# Patient Record
Sex: FEMALE | Race: WHITE | Employment: STUDENT | URBAN - METROPOLITAN AREA
[De-identification: names, ages, dates, MRNs, and addresses within clinical notes are randomized per-mention and may not be internally consistent; named-entity substitution may affect disease eponyms.]

---

## 2018-10-23 ENCOUNTER — TELEPHONE (OUTPATIENT)
Dept: DERMATOLOGY | Facility: CLINIC | Age: 18
End: 2018-10-23

## 2018-10-23 NOTE — TELEPHONE ENCOUNTER
Dermatology Pre-visit Call:    Reason for visit : Acne     Left voicemail regarding appointment on 10/26/18 at 1030. Call back number provided for questions or rescheduling.

## 2018-10-26 ENCOUNTER — OFFICE VISIT (OUTPATIENT)
Dept: DERMATOLOGY | Facility: CLINIC | Age: 18
End: 2018-10-26
Payer: COMMERCIAL

## 2018-10-26 DIAGNOSIS — L70.0 ACNE VULGARIS: Primary | ICD-10-CM

## 2018-10-26 RX ORDER — NORGESTIMATE AND ETHINYL ESTRADIOL 7DAYSX3 LO
1 KIT ORAL DAILY
Qty: 28 TABLET | Refills: 3 | Status: SHIPPED | OUTPATIENT
Start: 2018-10-26 | End: 2018-12-17

## 2018-10-26 RX ORDER — ADAPALENE 45 G/G
GEL TOPICAL AT BEDTIME
Qty: 15 G | Refills: 3 | Status: SHIPPED | OUTPATIENT
Start: 2018-10-26 | End: 2019-01-23

## 2018-10-26 RX ORDER — CLINDAMYCIN PHOSPHATE 10 UG/ML
LOTION TOPICAL EVERY MORNING
Qty: 60 ML | Refills: 3 | Status: SHIPPED | OUTPATIENT
Start: 2018-10-26 | End: 2019-01-23

## 2018-10-26 ASSESSMENT — PAIN SCALES - GENERAL: PAINLEVEL: NO PAIN (0)

## 2018-10-26 NOTE — MR AVS SNAPSHOT
After Visit Summary   10/26/2018    Sabra Mtz    MRN: [id number]           Patient Information     Date Of Birth          2000        Visit Information        Provider Department      10/26/2018 10:30 AM Jung Naidu MD [facility] Dermatology        Today's Diagnoses     Acne vulgaris    -  1       Follow-ups after your visit        Your next 10 appointments already scheduled     2018 12:00 PM CST   (Arrive by 11:45 AM)   Return Visit with JACQUELINE Lucero [facility] Dermatology ([facility])    [address] [place] [id number] [phone]              Who to contact     Please call your clinic at [phone] to:    Ask questions about your health    Make or cancel appointments    Discuss your medicines    Learn about your test results    Speak to your doctor            Additional Information About Your Visit        MyChart Information     Cubikalt is an electronic gateway that provides easy, online access to your medical records. With Muut, you can request a clinic appointment, read your test results, renew a prescription or communicate with your care team.     To sign up for Cubikalt visit the website at [email or web address]   You will be asked to enter the access code listed below, as well as some personal information. Please follow the directions to create your username and password.     Your access code is: [email or web address]  Expires: 2019  6:30 AM     Your access code will  in 90 days. If you need help or a new code, please contact your [facility] or call [phone] for assistance.        Care EveryWhere ID     This is your Care EveryWhere ID. This could be used by other organizations to access your [facility] medical records  [id number]         Blood Pressure from Last 3 Encounters:   16 105/56    Weight from Last 3 Encounters:   16 52.2 kg (115 lb) (28  %)*     * Growth percentiles are based on [facility] 2-20 Years data.              Today, you had the following     No orders found for display         Today's Medication Changes          These changes are accurate as of 10/26/18 11:43 AM.  If you have any questions, ask your nurse or doctor.               Start taking these medicines.        Dose/Directions    adapalene 0.1 % gel   Commonly known as:  DIFFERIN   Used for:  Acne vulgaris   Started by:  Jung Naidu MD        Apply topically At Bedtime   Quantity:  15 g   Refills:  3       benzoyl peroxide 5 % Liqd   Used for:  Acne vulgaris   Started by:  Jung Naidu MD        Use daily as directed   Quantity:  226 g   Refills:  3       clindamycin 1 % lotion   Commonly known as:  CLEOCIN T   Used for:  Acne vulgaris   Started by:  Jung Naidu MD        Apply topically every morning   Quantity:  60 mL   Refills:  3       norgestim-eth estrad triphasic 0.18/0.215/0.25 MG-25 MCG per tablet   Commonly known as:  ORTHO TRI-CYCLEN LO   Used for:  Acne vulgaris   Started by:  Jung Naidu MD        Dose:  1 tablet   Take 1 tablet by mouth daily   Quantity:  28 tablet   Refills:  3            Where to get your medicines      These medications were sent to [address] [address]    Hours:  TRANSPLANT PHONE NUMBER [phone] Phone:  [phone]     adapalene 0.1 % gel    benzoyl peroxide 5 % Liqd    clindamycin 1 % lotion    norgestim-eth estrad triphasic 0.18/0.215/0.25 MG-25 MCG per tablet                Primary Care Provider Fax #    Physician No Ref-Primary [phone]       No address on file        Equal Access to Services     SILVINO LANDRUM : Ella Betancourt, wazack licona, qaybta kaaljacqueline smalls, kevin caceres. So [facility] [phone].    ATENCIÓN: Si habla español, tiene a younger disposición  servicios gratuitos de asistencia lingüística. Violet mancera [phone].    We comply with applicable federal civil rights laws and [place] laws. We do not discriminate on the basis of race, color, national origin, age, disability, sex, sexual orientation, or gender identity.            Thank you!     Thank you for choosing [facility] DERMATOLOGY  for your care. Our goal is always to provide you with excellent care. Hearing back from our patients is one way we can continue to improve our services. Please take a few minutes to complete the written survey that you may receive in the mail after your visit with us. Thank you!             Your Updated Medication List - Protect others around you: Learn how to safely use, store and throw away your medicines at www.disposemymeds.org.          This list is accurate as of 10/26/18 11:43 AM.  Always use your most recent med list.                   Brand Name Dispense Instructions for use Diagnosis    adapalene 0.1 % gel    DIFFERIN    15 g    Apply topically At Bedtime    Acne vulgaris       benzoyl peroxide 5 % Liqd     226 g    Use daily as directed    Acne vulgaris       clindamycin 1 % lotion    CLEOCIN T    60 mL    Apply topically every morning    Acne vulgaris       norgestim-eth estrad triphasic 0.18/0.215/0.25 MG-25 MCG per tablet    ORTHO TRI-CYCLEN LO    28 tablet    Take 1 tablet by mouth daily    Acne vulgaris

## 2018-10-26 NOTE — PROGRESS NOTES
[facility] Dermatology Note      Dermatology Problem List:  1. Acne vulgaris    Start PO birthcontrol today    Start clindamycin lotion qAM    Start differin qPM    3wk appointment in [facility] to start accutane    Encounter Date: Oct 26, 2018    CC:   Chief Complaint   Patient presents with     Acne     Sabra is here today to talk about going on acne. Today is a great for her acne. She states that her chest and back are the main areas.          History of Present Illness:  Ms. Sabra Mtz is a 18 year old female who presents for evaluation of acne. The patient states the she has been struggling with acne for the last 7 years. Her face, neck, upper chest and upper back are most affected. She has tried a variety of topicals, oral doxycycline and oral birth control - none of which have made a substantial difference. Her younger sister just completed 6 months of accutane with remarkable results and the patient would like to try it. Her acne fluctuate a lot and are worse in the summer. 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 She denies tobacco, etoh.     Past Medical History:   There is no problem list on file for this patient.    History reviewed. No pertinent past medical history.  History reviewed. No pertinent surgical history.    Social History:  Patient  reports that she has never smoked. She has never used smokeless tobacco.    Family History:  History reviewed. No pertinent family history.    Medications:  No current outpatient prescriptions on file.        No Known Allergies      Review of Systems:  -As per HPI  -Constitutional: The patient denies fatigue, fevers, chills, unintended weight loss, and night sweats.  -HEENT: Patient denies nonhealing oral sores.  -Skin: As above in HPI. No additional skin concerns.    Physical exam:  Vitals: There were no vitals taken for this visit.  GEN: This is a well developed, well-nourished female in no acute distress, in a  pleasant mood.    SKIN: Acne exam, which includes the face, neck, upper central chest, and upper central back was performed.  -Face and neck - relatively clear without comedones or scarring  -Upper chest is relatively clear without comedones or scarring  -There are superifical acneiform papules with intermixed open and closed comedones on the back.   -No other lesions of concern on areas examined.     Impression/Plan:  2. Acne vulgaris    Start PO birthcontrol today    Start clindamycin lotion qAM    Start differin qPM    3wk appointment in [facility] clinic to start accutane (I would propose 30mg daily for about 6months)    CC Dr. Naidu on close of this encounter.  Follow-up in 3 weeks with [place] for pledge, earlier for new or changing lesions        Staff Physician Comments:  I saw and evaluated the patient with the resident and I agree with the assessment and plan as documented in the resident's note.    Jung Naidu MD  Professor  Head of Dermato-Allergy Division  Department of Dermatology  [facility]  .    Staff Involved:  Resident(Leroy Garcia)/Staff(as above)

## 2018-10-26 NOTE — LETTER
Date:October 29, 2018      Patient was self referred, no letter generated. Do not send.        [facility] Health Information

## 2018-10-26 NOTE — NURSING NOTE
Chief Complaint   Patient presents with     Acne     Sabra is here today to talk about going on acne. Today is a great for her acne. She states that her chest and back are the main areas.      Juli Pederson, CMA

## 2018-11-16 ENCOUNTER — OFFICE VISIT (OUTPATIENT)
Dept: DERMATOLOGY | Facility: CLINIC | Age: 18
End: 2018-11-16
Payer: COMMERCIAL

## 2018-11-16 ENCOUNTER — TELEPHONE (OUTPATIENT)
Dept: DERMATOLOGY | Facility: CLINIC | Age: 18
End: 2018-11-16

## 2018-11-16 DIAGNOSIS — L70.0 ACNE VULGARIS: ICD-10-CM

## 2018-11-16 DIAGNOSIS — L70.0 ACNE VULGARIS: Primary | ICD-10-CM

## 2018-11-16 LAB
ALBUMIN SERPL-MCNC: 4 G/DL (ref 3.4–5)
ALP SERPL-CCNC: 54 U/L (ref 40–150)
ALT SERPL W P-5'-P-CCNC: 26 U/L (ref 0–50)
ANION GAP SERPL CALCULATED.3IONS-SCNC: 4 MMOL/L (ref 3–14)
AST SERPL W P-5'-P-CCNC: 23 U/L (ref 0–45)
BASOPHILS # BLD AUTO: 0 10E9/L (ref 0–0.2)
BASOPHILS NFR BLD AUTO: 0.5 %
BILIRUB SERPL-MCNC: 0.4 MG/DL (ref 0.2–1.3)
BUN SERPL-MCNC: 11 MG/DL (ref 7–30)
CALCIUM SERPL-MCNC: 8.7 MG/DL (ref 8.5–10.1)
CHLORIDE SERPL-SCNC: 104 MMOL/L (ref 94–109)
CHOLEST SERPL-MCNC: 168 MG/DL
CO2 SERPL-SCNC: 28 MMOL/L (ref 20–32)
CREAT SERPL-MCNC: 0.88 MG/DL (ref 0.52–1.04)
DIFFERENTIAL METHOD BLD: ABNORMAL
EOSINOPHIL # BLD AUTO: 0 10E9/L (ref 0–0.7)
EOSINOPHIL NFR BLD AUTO: 0.7 %
ERYTHROCYTE [DISTWIDTH] IN BLOOD BY AUTOMATED COUNT: 13.2 % (ref 10–15)
GFR SERPL CREATININE-BSD FRML MDRD: 82 ML/MIN/1.7M2
GLUCOSE SERPL-MCNC: 83 MG/DL (ref 70–99)
HCG UR QL: NEGATIVE
HCT VFR BLD AUTO: 41.2 % (ref 35–47)
HDLC SERPL-MCNC: 48 MG/DL
HGB BLD-MCNC: 12.9 G/DL (ref 11.7–15.7)
IMM GRANULOCYTES # BLD: 0 10E9/L (ref 0–0.4)
IMM GRANULOCYTES NFR BLD: 0.4 %
LDLC SERPL CALC-MCNC: 103 MG/DL
LYMPHOCYTES # BLD AUTO: 1.5 10E9/L (ref 0.8–5.3)
LYMPHOCYTES NFR BLD AUTO: 27.4 %
MCH RBC QN AUTO: 28 PG (ref 26.5–33)
MCHC RBC AUTO-ENTMCNC: 31.3 G/DL (ref 31.5–36.5)
MCV RBC AUTO: 89 FL (ref 78–100)
MONOCYTES # BLD AUTO: 0.3 10E9/L (ref 0–1.3)
MONOCYTES NFR BLD AUTO: 5.7 %
NEUTROPHILS # BLD AUTO: 3.7 10E9/L (ref 1.6–8.3)
NEUTROPHILS NFR BLD AUTO: 65.3 %
NONHDLC SERPL-MCNC: 120 MG/DL
NRBC # BLD AUTO: 0 10*3/UL
NRBC BLD AUTO-RTO: 0 /100
PLATELET # BLD AUTO: 278 10E9/L (ref 150–450)
POTASSIUM SERPL-SCNC: 4.1 MMOL/L (ref 3.4–5.3)
PROT SERPL-MCNC: 7.6 G/DL (ref 6.8–8.8)
RBC # BLD AUTO: 4.61 10E12/L (ref 3.8–5.2)
SODIUM SERPL-SCNC: 137 MMOL/L (ref 133–144)
TRIGL SERPL-MCNC: 85 MG/DL
WBC # BLD AUTO: 5.6 10E9/L (ref 4–11)

## 2018-11-16 ASSESSMENT — PAIN SCALES - GENERAL: PAINLEVEL: NO PAIN (0)

## 2018-11-16 NOTE — LETTER
11/16/2018       RE: Sabra Mtz  [address] [facility]  [address]  [place] [address]     Dear Colleague,    Thank you for referring your patient, Sabra Mtz, to the [facility] DERMATOLOGY at [facility]. Please see a copy of my visit note below.    [facility] Dermatology Note      Dermatology Problem List:  1. Acne vulgaris  - OCP, clindamycin 1% lotion, Differin 0.1% gel  - Initiate Accutane 11/16/18     Encounter Date: Nov 16, 2018    CC:   Chief Complaint   Patient presents with     Derm Problem     Acne follow up , Sabra would like to start accutane.      History of Present Illness:  Ms. Sabra Mtz is a 18 year old female who presents today for an acne follow up. The patient was last seen in the dermatology clinic on 10/26/18 by Dr. Naidu during which she started OCP birth control, clindamycin 1% lotion and Differin gel nightly for her acne vulgaris. Accutane was discussed at this visit.     Today she reports that there have been no changes in her acne on the oral birth control she started at her last visit. Her problematic acne is still on her back and on her chest. The acne on her back has been flaring often. She would still like to start Accutane at this visit. She has been using her topicals as prescribed.    Otherwise the patient reports no additional painful, bleeding, nonhealing or pruritic lesions and denies any new or changing moles.    Past Medical History:   There is no problem list on file for this patient.    No past medical history on file.  No past surgical history on file.    Social History:  Patient  reports that she has never smoked. She has never used smokeless tobacco.       Family History:  No family history on file.   Her sister finished a course of Accutane recently.     Medications:  Current Outpatient Prescriptions   Medication Sig Dispense Refill     adapalene (DIFFERIN) 0.1 % gel Apply topically At Bedtime 15 g  3     benzoyl peroxide 5 % LIQD Use daily as directed 226 g 3     clindamycin (CLEOCIN T) 1 % lotion Apply topically every morning 60 mL 3     norgestim-eth estrad triphasic (ORTHO TRI-CYCLEN LO) 0.18/0.215/0.25 MG-25 MCG per tablet Take 1 tablet by mouth daily 28 tablet 3        No Known Allergies      Review of Systems:  -Constitutional: The patient is in her usual state of health   -Skin: As above in HPI. No additional skin concerns.    Physical exam:  Vitals: There were no vitals taken for this visit.  GEN: This is a well developed, well-nourished female in no acute distress, in a pleasant mood.    SKIN: Acne exam, which includes the face, neck, upper central chest, and upper central back was performed.  - Scattered small pustules on central chest   - Few resolving inflammatory papules to her lateral face   -There are superifical acneiform papules with intermixed open and closed comedones on the back.   -No other lesions of concern on areas examined.     Impression/Plan:  1. Acne vulgaris- no improvement on topicals and OCP  -Discussion of the risks and side effects of Accutane including but not limited to mucocutaneous dryness, arthralgias, myalgias, depression, suicidal ideation, headache, blurred vision,  increase in liver function tests, increase in lipids, and teratogenic effects. Discussed need for two forms of contraception. The [employer] brochure was provided and the contents discussed with the patient. The patient was counseled they cannot give blood while on Accutane. No personal or family history of inflammatory bowel disease or hypertriglyceridemia known to patient. Reviewed need to avoid alcohol on medication.   Ipledge program consent was obtained. Patient counseled that if they wear contacts eye may become too dry to tolerate. Recommend follow up with eye doctor.   -At the next visit, we will begin Accutane 40 mg daily.  One month supply with no refills provided. Unable to obtain pt weight at  this time, will reassess at follow up in order to determine goal dose.   Baseline labs including CBC, CMP and fasting lipids  will be obtained.     Baseline pregnancy test today. The patients two forms of contraception are OCP and male latex condoms.   -She will have another pregnancy test in 1 month and then send her medication.   Total cumulative dose 0 mg (0 mg/kg).   Patient's I-pledge # is [phone].   The patient will stop all acne medications upon starting Accutane     Staff Involved:  Scribe/Staff    Scribe Disclosure:   I, Krysta Peters, am serving as a scribe to document services personally performed by Nikkie Ferrari PA-C, based on data collection and the provider's statements to me.    Provider Disclosure:   The documentation recorded by the scribe accurately reflects the services I personally performed and the decisions made by me.    All risks, benefits and alternatives were discussed with patient.  Patient is in agreement and understands the assessment and plan.  All questions were answered.  Sun Screen Education was given.   Return to Clinic in 2 months or sooner as needed.     Nikkie Ferrari PA-C

## 2018-11-16 NOTE — MR AVS SNAPSHOT
After Visit Summary   11/16/2018    Sabra Mtz    MRN: [id number]           Patient Information     Date Of Birth          2000        Visit Information        Provider Department      11/16/2018 12:00 PM Nikkie Ferrari PA-C [facility] Dermatology        Today's Diagnoses     Acne vulgaris    -  1       Follow-ups after your visit        Your next 10 appointments already scheduled     Nov 16, 2018  1:00 PM CST   LAB with [facility] ([facility])    [address] [place] [id number] [phone]           Please do not eat 10-12 hours before your appointment if you are coming in fasting for labs on lipids, cholesterol, or glucose (sugar). This does not apply to pregnant women. Water, hot tea and black coffee (with nothing added) are okay. Do not drink other fluids, diet soda or chew gum.            Dec 17, 2018  1:30 PM CST   LAB with  LAB   [facility] ([facility])    [address] [place] [id number] [phone]           Please do not eat 10-12 hours before your appointment if you are coming in fasting for labs on lipids, cholesterol, or glucose (sugar). This does not apply to pregnant women. Water, hot tea and black coffee (with nothing added) are okay. Do not drink other fluids, diet soda or chew gum.            Dec 17, 2018  2:00 PM CST   (Arrive by 1:45 PM)   Return Visit with Delfin Davila MD   [facility] Dermatology ([facility])    [address] [place] [id number] [phone]              Future tests that were ordered for you today     Open Standing Orders        Priority Remaining Interval Expires Ordered    HCG qualitative urine Routine 10/10 monthly 11/16/2019 11/16/2018          Open Future Orders        Priority Expected Expires Ordered    CBC with platelets differential Routine  11/16/2019 11/16/2018     Comprehensive metabolic panel Routine  11/16/2019 11/16/2018    Lipid panel reflex to direct LDL Fasting Routine  11/16/2019 11/16/2018            Who to contact     Please call your clinic at [phone] to:    Ask questions about your health    Make or cancel appointments    Discuss your medicines    Learn about your test results    Speak to your doctor            Additional Information About Your Visit        [employer] Information     [employer] gives you secure access to your electronic health record. If you see a primary care provider, you can also send messages to your care team and make appointments. If you have questions, please call your primary care clinic.  If you do not have a primary care provider, please call [phone] and they will assist you.      [employer] is an electronic gateway that provides easy, online access to your medical records. With [employer], you can request a clinic appointment, read your test results, renew a prescription or communicate with your care team.     To access your existing account, please contact your [facility] or call [phone] for assistance.        Care EveryWhere ID     This is your Care EveryWhere ID. This could be used by other organizations to access your [facility] medical records  [id number]         Blood Pressure from Last 3 Encounters:   11/19/16 105/56    Weight from Last 3 Encounters:   11/19/16 52.2 kg (115 lb) (28 %)*     * Growth percentiles are based on CDC 2-20 Years data.               Primary Care Provider Fax #    Physician No Ref-Primary [phone]       No address on file        Equal Access to Services     SILVINO LANDRUM : Hadii antonietta carringtono Sozena, waaxda luqadaha, qaybta kaalmada adeegyada, kevin hunt . So [facility] [phone].    ATENCIÓN: Si habla español, tiene a younger disposición servicios gratuitos de asistencia lingüística. Llame al [phone].    We comply with applicable federal  civil rights laws and [place] laws. We do not discriminate on the basis of race, color, national origin, age, disability, sex, sexual orientation, or gender identity.            Thank you!     Thank you for choosing [facility] DERMATOLOGY  for your care. Our goal is always to provide you with excellent care. Hearing back from our patients is one way we can continue to improve our services. Please take a few minutes to complete the written survey that you may receive in the mail after your visit with us. Thank you!             Your Updated Medication List - Protect others around you: Learn how to safely use, store and throw away your medicines at www.disposemymeds.org.          This list is accurate as of 11/16/18 12:46 PM.  Always use your most recent med list.                   Brand Name Dispense Instructions for use Diagnosis    adapalene 0.1 % gel    DIFFERIN    15 g    Apply topically At Bedtime    Acne vulgaris       benzoyl peroxide 5 % Liqd     226 g    Use daily as directed    Acne vulgaris       clindamycin 1 % lotion    CLEOCIN T    60 mL    Apply topically every morning    Acne vulgaris       norgestim-eth estrad triphasic 0.18/0.215/0.25 MG-25 MCG per tablet    ORTHO TRI-CYCLEN LO    28 tablet    Take 1 tablet by mouth daily    Acne vulgaris

## 2018-11-16 NOTE — TELEPHONE ENCOUNTER
Prior Authorization Not Needed per Insurance    Medication: isotretinoin 40mg capsules - NO PA NEEDED  Insurance Company: [employer] - Phone [phone] Fax [phone]  Expected CoPay:      Pharmacy Filling the Rx: [facility] [place], [place] - [address] [place] [address] [phone]  Pharmacy Notified:    Patient Notified:

## 2018-11-16 NOTE — PROGRESS NOTES
[facility] Dermatology Note      Dermatology Problem List:  1. Acne vulgaris  - OCP, clindamycin 1% lotion, Differin 0.1% gel  - Initiate Accutane 11/16/18     Encounter Date: Nov 16, 2018    CC:   Chief Complaint   Patient presents with     Derm Problem     Acne follow up , Sabra would like to start accutane.      History of Present Illness:  Ms. Sabra Mtz is a 18 year old female who presents today for an acne follow up. The patient was last seen in the dermatology clinic on 10/26/18 by Dr. Naidu during which she started OCP birth control, clindamycin 1% lotion and Differin gel nightly for her acne vulgaris. Accutane was discussed at this visit.     Today she reports that there have been no changes in her acne on the oral birth control she started at her last visit. Her problematic acne is still on her back and on her chest. The acne on her back has been flaring often. She would still like to start Accutane at this visit. She has been using her topicals as prescribed.    Otherwise the patient reports no additional painful, bleeding, nonhealing or pruritic lesions and denies any new or changing moles.    Past Medical History:   There is no problem list on file for this patient.    No past medical history on file.  No past surgical history on file.    Social History:  Patient  reports that she has never smoked. She has never used smokeless tobacco.       Family History:  No family history on file.   Her sister finished a course of Accutane recently.     Medications:  Current Outpatient Prescriptions   Medication Sig Dispense Refill     adapalene (DIFFERIN) 0.1 % gel Apply topically At Bedtime 15 g 3     benzoyl peroxide 5 % LIQD Use daily as directed 226 g 3     clindamycin (CLEOCIN T) 1 % lotion Apply topically every morning 60 mL 3     norgestim-eth estrad triphasic (ORTHO TRI-CYCLEN LO) 0.18/0.215/0.25 MG-25 MCG per tablet Take 1 tablet by mouth daily 28 tablet 3        No Known  Allergies      Review of Systems:  -Constitutional: The patient is in her usual state of health   -Skin: As above in HPI. No additional skin concerns.    Physical exam:  Vitals: There were no vitals taken for this visit.  GEN: This is a well developed, well-nourished female in no acute distress, in a pleasant mood.    SKIN: Acne exam, which includes the face, neck, upper central chest, and upper central back was performed.  - Scattered small pustules on central chest   - Few resolving inflammatory papules to her lateral face   -There are superifical acneiform papules with intermixed open and closed comedones on the back.   -No other lesions of concern on areas examined.     Impression/Plan:  1. Acne vulgaris- no improvement on topicals and OCP  -Discussion of the risks and side effects of Accutane including but not limited to mucocutaneous dryness, arthralgias, myalgias, depression, suicidal ideation, headache, blurred vision,  increase in liver function tests, increase in lipids, and teratogenic effects. Discussed need for two forms of contraception. The ipledgeprogram brochure was provided and the contents discussed with the patient. The patient was counseled they cannot give blood while on Accutane. No personal or family history of inflammatory bowel disease or hypertriglyceridemia known to patient. Reviewed need to avoid alcohol on medication.   Ipledge program consent was obtained. Patient counseled that if they wear contacts eye may become too dry to tolerate. Recommend follow up with eye doctor.   -At the next visit, we will begin Accutane 40 mg daily.  One month supply with no refills provided. Unable to obtain pt weight at this time, will reassess at follow up in order to determine goal dose.   Baseline labs including CBC, CMP and fasting lipids  will be obtained.     Baseline pregnancy test today. The patients two forms of contraception are OCP and male latex condoms.   -She will have another pregnancy  test in 1 month and then send her medication.   Total cumulative dose 0 mg (0 mg/kg).   Patient's I-pledge # is [id number].   The patient will stop all acne medications upon starting Accutane     Staff Involved:  Scribe/Staff    Scribe Disclosure:   I, Krysta Peters, am serving as a scribe to document services personally performed by Nikkie Ferrari PA-C, based on data collection and the provider's statements to me.    Provider Disclosure:   The documentation recorded by the scribe accurately reflects the services I personally performed and the decisions made by me.    All risks, benefits and alternatives were discussed with patient.  Patient is in agreement and understands the assessment and plan.  All questions were answered.  Sun Screen Education was given.   Return to Clinic in 2 months or sooner as needed.   Nikkie Ferrari PA-C   [facility]

## 2018-11-16 NOTE — NURSING NOTE
Dermatology Rooming Note    Sabra Mtz's goals for this visit include:   Chief Complaint   Patient presents with     Derm Problem     Acne follow up , Sabra would like to start accutane.      Olya Mckenna LPN

## 2018-11-20 ENCOUNTER — HEALTH MAINTENANCE LETTER (OUTPATIENT)
Age: 18
End: 2018-11-20

## 2018-12-17 ENCOUNTER — OFFICE VISIT (OUTPATIENT)
Dept: DERMATOLOGY | Facility: CLINIC | Age: 18
End: 2018-12-17
Payer: COMMERCIAL

## 2018-12-17 ENCOUNTER — TELEPHONE (OUTPATIENT)
Dept: DERMATOLOGY | Facility: CLINIC | Age: 18
End: 2018-12-17

## 2018-12-17 DIAGNOSIS — L70.0 ACNE VULGARIS: Primary | ICD-10-CM

## 2018-12-17 DIAGNOSIS — L70.0 ACNE VULGARIS: ICD-10-CM

## 2018-12-17 DIAGNOSIS — Z79.899 ENCOUNTER FOR LONG-TERM (CURRENT) USE OF HIGH-RISK MEDICATION: ICD-10-CM

## 2018-12-17 LAB — HCG UR QL: NEGATIVE

## 2018-12-17 RX ORDER — NORGESTIMATE AND ETHINYL ESTRADIOL 7DAYSX3 LO
1 KIT ORAL DAILY
Qty: 28 TABLET | Refills: 3 | Status: SHIPPED | OUTPATIENT
Start: 2018-12-17 | End: 2019-04-30

## 2018-12-17 RX ORDER — ISOTRETINOIN 40 MG/1
40 CAPSULE ORAL DAILY
Qty: 30 CAPSULE | Refills: 0 | Status: SHIPPED | OUTPATIENT
Start: 2018-12-17 | End: 2019-01-23

## 2018-12-17 ASSESSMENT — PAIN SCALES - GENERAL: PAINLEVEL: NO PAIN (0)

## 2018-12-17 NOTE — NURSING NOTE
Dermatology Rooming Note    Sabra Mtz's goals for this visit include:   Chief Complaint   Patient presents with     Derm Problem     Sabra is here for a followup regarding acne and medications.      aHnh Holbrook LPN

## 2018-12-17 NOTE — PATIENT INSTRUCTIONS
Start Accutane 40mg daily  Continue OCPs and condom use  Stop other topical and acne treatments (both prescribed and over-the-counter)  Okay to use gentle facial cleansers (Dove, Cetaphil) and non-comedogenic moisturizers (Ceravie, Cetaphil)   Follow-up in 30 days w/ labs   Please contact clinic if you have any headaches that awaken you from sleep, vision changes, or mood changes/depression

## 2018-12-17 NOTE — TELEPHONE ENCOUNTER
[facility] Call Center    Phone Message    May a detailed message be left on voicemail: yes    Reason for Call: Other: PT needs Dr. Davila to confirm her monthly visits sow she can complete her I Pledge forms online.  PT would like it to be done today.  Please follow up.     Action Taken: Message routed to:  [facility] derm

## 2018-12-17 NOTE — PROGRESS NOTES
[facility] Dermatology Note      Dermatology Problem List:  1. Acne vulgaris: refractory to topicals, OCPs, and doxycycline, with scarring. Starting isotretinoin 40 mg daily (12/17/18).   - iPLEDGE # [id number]  - Cumulative dose: 0 mg  - Goal dose (120-150 mg/kg): 6366-4309 mg  - birth control: OCPs and condoms    Encounter Date: Dec 17, 2018    CC:   Chief Complaint   Patient presents with     Derm Problem     Sabra is here for a followup regarding acne and medications.          History of Present Illness:  Ms. Sabra Mtz is a 18 year old woman w/ PMHx of acne vulgaris refractory to OCPs, topical retinoids, topical clindamycin, and oral doxycycline who presents for initiation of systemic isotretinoin. She has been following in derm clinic and completed her iPLEDGE paperwork. She's had 2 negative pregnancy tests and is using OCPs and condoms for contraception.     Her acne primarily involves her back and to some extent, her chest, with blackheads, whiteheads, deeper red inflammatory papules, and scarring. Has may improved a little bit w/ OCPs but continues to be present and flare. Did not improve w/ prior therapies, such as topical retinoids and both topical/systemic antibiotics. PO isotretinoin worked well for her sister. She is enthusiastic to try this.     No other skin concerns at this time. No rashes or other skin lesions.     Past Medical History:   - acne vulgaris   - labial abscess s/p I&D    History reviewed. No pertinent surgical history.    Social History:   reports that  has never smoked. she has never used smokeless tobacco.    Family History:  History reviewed. No pertinent family history.    Medications:  Current Outpatient Medications   Medication Sig Dispense Refill     adapalene (DIFFERIN) 0.1 % gel Apply topically At Bedtime 15 g 3     benzoyl peroxide 5 % LIQD Use daily as directed 226 g 3     clindamycin (CLEOCIN T) 1 % lotion Apply topically every morning 60 mL 3      norgestim-eth estrad triphasic (ORTHO TRI-CYCLEN LO) 0.18/0.215/0.25 MG-25 MCG per tablet Take 1 tablet by mouth daily 28 tablet 3        No Known Allergies      Review of Systems:  -As per HPI  -Constitutional: The patient denies fatigue, fevers, chills, unintended weight loss, lymphadenopathy, and night sweats. No recent illnesses   -Skin: As above in HPI. No additional skin concerns.    Physical exam:  Vitals: There were no vitals taken for this visit.  GEN: This is a well developed, well-nourished female in no acute distress, in a pleasant mood.    SKIN: Sun-exposed skin, which includes the head/face, neck, both arms, digits, and/or nails was examined.   - Scattered erythematous papulopustules and admixed comedones on the face, back, and to a lesser extent, the chest, with scattered atrophic pink scars in this distribution  - no concerning lesions on the arms and hands       Impression/Plan:  1. Acne vulgaris: Involves primarily the face, back, and chest. Has minimally improved w/ OCPs. Did not respond to other therapies. Completed iPLEDGE paperwork, on OCPs, and has had 2 neg pregnancy test. Liver testing and TG/lipids in essentially normal ranges. Pt approximately 55 kg. Will start isotretinoin 40mg daily. Consider increasing to 60mg daily pending response in 30 days. Goal 120-150 mg/kg.    Start isotretinoin PO 40mg daily    Given 1 mo supply, educated re: side effects     Refilled OCP Rx    Continue OCP and condom use    Stop other acne treatments    RTC in 30 days w/ CMP, lipid panel, urine pregnancy test      Pt's iPLEDGE # is [id number].    CC Dr. Davila on close of this encounter.  Follow-up in 4 weeks.       Dr. Davila staffed the patient.    Staff Involved:  Resident(Joel Maguire)/Staff(as above)    Staff Physician Comments:   I saw and evaluated the patient with the resident and I edited the assessment and plan as documented in the note. I was present for the examination.    Delfin Davila,  MD   of Dermatology  Department of Dermatology  [facility] [employer] [facility]

## 2018-12-17 NOTE — TELEPHONE ENCOUNTER
[facility] Call Center    Phone Message    May a detailed message be left on voicemail: yes    Reason for Call: Other: Pt states she is leaving by 7am 12/18/2018 and is wanting to get a call back today.     Action Taken: Message routed to:  [facility] Eye

## 2018-12-17 NOTE — TELEPHONE ENCOUNTER
Sabra has been confirmed in iPledge. She is answering her questions and will  her prescription tonight.

## 2018-12-19 PROBLEM — L70.0 ACNE VULGARIS: Status: ACTIVE | Noted: 2018-12-19

## 2019-01-10 ENCOUNTER — TELEPHONE (OUTPATIENT)
Dept: DERMATOLOGY | Facility: CLINIC | Age: 19
End: 2019-01-10

## 2019-01-10 NOTE — TELEPHONE ENCOUNTER
[facility] Call Center    Phone Message    May a detailed message be left on voicemail: yes    Reason for Call: Other: Pt is calling back. She is needing the medication refilled today, as she is leaving town tomorrow in the early hours. She is requesting the prescription to be transfrered. Please call the pt back ASAP. Thank you     Action Taken: Message routed to:  [facility] Dermatology

## 2019-01-10 NOTE — TELEPHONE ENCOUNTER
Talked to Sabra and let her know she can call the [place] pharmacy and ask them to transfer the prescription.    Zara Wang, A

## 2019-01-10 NOTE — TELEPHONE ENCOUNTER
[facility] Call Center    Phone Message    May a detailed message be left on voicemail: yes    Reason for Call: Other: PT is requesting birth control script to be sent to a diff. pharmacy.  She would like it sent to the [facility] in [place] on [address]- . [phone].  PT is requesting to have it sent today.  Please follow up.      Action Taken: Message routed to:  [facility] derm

## 2019-01-19 DIAGNOSIS — Z79.899 ENCOUNTER FOR LONG-TERM (CURRENT) USE OF HIGH-RISK MEDICATION: ICD-10-CM

## 2019-01-19 DIAGNOSIS — L70.0 ACNE VULGARIS: ICD-10-CM

## 2019-01-19 LAB
ALBUMIN SERPL-MCNC: 4.1 G/DL (ref 3.4–5)
ALP SERPL-CCNC: 70 U/L (ref 40–150)
ALT SERPL W P-5'-P-CCNC: 21 U/L (ref 0–50)
ANION GAP SERPL CALCULATED.3IONS-SCNC: 5 MMOL/L (ref 3–14)
AST SERPL W P-5'-P-CCNC: 26 U/L (ref 0–45)
BILIRUB SERPL-MCNC: 0.3 MG/DL (ref 0.2–1.3)
BUN SERPL-MCNC: 10 MG/DL (ref 7–30)
CALCIUM SERPL-MCNC: 8.9 MG/DL (ref 8.5–10.1)
CHLORIDE SERPL-SCNC: 104 MMOL/L (ref 94–109)
CHOLEST SERPL-MCNC: 208 MG/DL
CO2 SERPL-SCNC: 29 MMOL/L (ref 20–32)
CREAT SERPL-MCNC: 0.94 MG/DL (ref 0.52–1.04)
GFR SERPL CREATININE-BSD FRML MDRD: 87 ML/MIN/{1.73_M2}
GLUCOSE SERPL-MCNC: 106 MG/DL (ref 70–99)
HCG UR QL: NEGATIVE
HDLC SERPL-MCNC: 44 MG/DL
LDLC SERPL CALC-MCNC: 143 MG/DL
NONHDLC SERPL-MCNC: 164 MG/DL
POTASSIUM SERPL-SCNC: 4.2 MMOL/L (ref 3.4–5.3)
PROT SERPL-MCNC: 8.1 G/DL (ref 6.8–8.8)
SODIUM SERPL-SCNC: 139 MMOL/L (ref 133–144)
TRIGL SERPL-MCNC: 105 MG/DL

## 2019-01-21 DIAGNOSIS — L70.0 ACNE VULGARIS: Primary | ICD-10-CM

## 2019-01-21 RX ORDER — ISOTRETINOIN 40 MG/1
40 CAPSULE ORAL DAILY
Qty: 30 CAPSULE | Refills: 0 | Status: SHIPPED | OUTPATIENT
Start: 2019-01-21 | End: 2019-01-23

## 2019-01-23 ENCOUNTER — OFFICE VISIT (OUTPATIENT)
Dept: DERMATOLOGY | Facility: CLINIC | Age: 19
End: 2019-01-23
Payer: COMMERCIAL

## 2019-01-23 DIAGNOSIS — L30.8 RETINOID DERMATITIS: ICD-10-CM

## 2019-01-23 DIAGNOSIS — L70.0 ACNE VULGARIS: Primary | ICD-10-CM

## 2019-01-23 DIAGNOSIS — Z79.899 ON ISOTRETINOIN THERAPY: ICD-10-CM

## 2019-01-23 RX ORDER — TRIAMCINOLONE ACETONIDE 1 MG/G
OINTMENT TOPICAL 2 TIMES DAILY
Qty: 80 G | Refills: 0 | Status: SHIPPED | OUTPATIENT
Start: 2019-01-23 | End: 2020-01-23

## 2019-01-23 RX ORDER — ISOTRETINOIN 40 MG/1
40 CAPSULE ORAL DAILY
Qty: 30 CAPSULE | Refills: 0 | Status: SHIPPED | OUTPATIENT
Start: 2019-01-23 | End: 2019-02-22

## 2019-01-23 ASSESSMENT — PAIN SCALES - GENERAL: PAINLEVEL: NO PAIN (0)

## 2019-01-23 NOTE — LETTER
1/23/2019       RE: Sabra Mtz  [address] [facility]  [address]  [place] [address]     Dear Colleague,    Thank you for referring your patient, Sabra Mtz, to the [facility] DERMATOLOGY at [facility]. Please see a copy of my visit note below.    [facility] Dermatology Note      Dermatology Problem List:  1. Acne vulgaris: refractory to topicals, OCPs, and doxycycline, with scarring. Starting isotretinoin 40 mg daily (12/17/18)- at the end of month # 1   - iPLEDGE # [id number], birth control: OCPs and condoms    Encounter Date: Jan 23, 2019    CC:  Chief Complaint   Patient presents with     Accutane     Sabra is here today for an accutane follow up- Sabra notes dry and swollen hands.          History of Present Illness:  Ms. Sabra Mtz is a 18 year old female who presents today in follow up for Accutane. The patient was last seen in the dermatology clinic on 12/17/18 by Dr. Davila during which she started isotretinoin 40 mg daily. Today she is at the end of month # 1. She reports that her skin is doing well. The acne on her back has gotten a little better. Admits to less frequent break outs. However, she recently noticed a dry rash on the tops of her hands and upper arms. She had labs on 1/19/19.     The patient reports tolerable mucocutaneous dryness, and denies arthralgias, myalgias, depression, suicidal ideation, diarrhea, headache, or blurred vision.      Past Medical History:   Patient Active Problem List   Diagnosis     Acne vulgaris     History reviewed. No pertinent past medical history.  History reviewed. No pertinent surgical history.    Social History:  Patient reports that  has never smoked. she has never used smokeless tobacco.    Family History:  History reviewed. No pertinent family history.    Medications:  Current Outpatient Medications   Medication Sig Dispense Refill     ISOtretinoin (ACCUTANE) 40 MG capsule Take 1 capsule  (40 mg) by mouth daily 30 capsule 0     ISOtretinoin (ACCUTANE) 40 MG capsule Take 1 capsule (40 mg) by mouth daily 30 capsule 0     norgestim-eth estrad triphasic (ORTHO TRI-CYCLEN LO) 0.18/0.215/0.25 MG-25 MCG tablet Take 1 tablet by mouth daily 28 tablet 3     adapalene (DIFFERIN) 0.1 % gel Apply topically At Bedtime (Patient not taking: Reported on 1/23/2019) 15 g 3     benzoyl peroxide 5 % LIQD Use daily as directed (Patient not taking: Reported on 1/23/2019) 226 g 3     clindamycin (CLEOCIN T) 1 % lotion Apply topically every morning (Patient not taking: Reported on 1/23/2019) 60 mL 3       No Known Allergies    Review of Systems:  -Constitutional: The patient denies fatigue, fevers, chills, unintended weight loss, and night sweats.  -Neuro: no HA or vision changes  -GI: No nausea, blood in stool, diarrhea, hx of IBD  -Psych: no depression/anxiety, mood changes, or sleep problems   -Musculoskeletal: no joint or muscle pain or swelling   -Heme/Lymph: no concerning bumps, no bleeding problems  -Skin: As above in HPI. No additional skin concerns.    Physical exam:  Vitals: There were no vitals taken for this visit.  GEN: This is a well developed, well-nourished female in no acute distress, in a pleasant mood.    SKIN: Acne exam, which includes the face, neck, upper central chest, and upper central back was performed. Significant for:   - Few resolving inflammatory papules on her upper back and flanks  - Face is clear   - Pink plaques on her dorsal hands, wrists   - Ill defined scaly plaques on her bilateral upper arms  - Lips display xerosis and slight fissuring at labial commissures   -No other lesions of concern on areas examined.       Impression/Plan:  1. Acne vulgaris, on isotretinoin- at the end of month #1  Edu on avoidance of alcohol, waxing, skin lasers, tattoos, and blood donation. Reminded patient of risks regarding pregnancy. Discussed iPledge and need to  medication within 7 days of this  visit. Advised to d/c all other acne medication. Reminded pt to take medication with a food containing fat.   Will continue isotretinoin 40 mg daily. May consider increasing dose at follow up if continuing to tolerate the medication well One month supply with no refills provided. Goal dose is from 150-220 mg/kg (8,250mg-12,100mg) for this 55 kg pt  Labs including CBC, GGT, BUN/Cr, fasting lipids and AST/ALT obtained on 1/19/19. Labs reviewed in Epic, found wnl  Qualitative hCG was also obtained  Contraception: OCP & condoms  Total cumulative dose 1,200 mg (21.8 mg/kg)   Patient's iPledge # is [id number]  Recommend home humidifier and regular use of emollients     2. Retinoid dermatitis, dorsal hands and bilateral upper arms    Start triamcinolone 0.1% ointment, apply BID to affected areas on hands and arms until resolved      Discussed diligent emollient use.     Follow-up in 1 month, earlier for new or changing lesions.       Staff Involved:  Scribe/Staff    Scribe Disclosure:   Krysta MARIE, am serving as a scribe to document services personally performed by Nikkie Ferrari PA-C, based on data collection and the provider's statements to me.      Provider Disclosure:   The documentation recorded by the scribe accurately reflects the services I personally performed and the decisions made by me.    All risks, benefits and alternatives were discussed with patient.  Patient is in agreement and understands the assessment and plan.  All questions were answered.  Sun Screen Education was given.   Return to Clinic in 1 months or sooner as needed.     Nikkie Ferrari PA-C   [facility]

## 2019-01-23 NOTE — NURSING NOTE
Dermatology Rooming Note    Sabra Mtz's goals for this visit include:   Chief Complaint   Patient presents with     Accutane     Sabra is here today for an accutane follow up- Sabra notes dry and swollen hands.      TITA Gu

## 2019-01-23 NOTE — PROGRESS NOTES
[facility] Dermatology Note      Dermatology Problem List:  1. Acne vulgaris: refractory to topicals, OCPs, and doxycycline, with scarring. Starting isotretinoin 40 mg daily (12/17/18)- at the end of month # 1   - iPLEDGE # [id number], birth control: OCPs and condoms    Encounter Date: Jan 23, 2019    CC:  Chief Complaint   Patient presents with     Accutane     Sabra is here today for an accutane follow up- Sabra notes dry and swollen hands.          History of Present Illness:  Ms. Sabra Mtz is a 18 year old female who presents today in follow up for Accutane. The patient was last seen in the dermatology clinic on 12/17/18 by Dr. Davila during which she started isotretinoin 40 mg daily. Today she is at the end of month # 1. She reports that her skin is doing well. The acne on her back has gotten a little better. Admits to less frequent break outs. However, she recently noticed a dry rash on the tops of her hands and upper arms. She had labs on 1/19/19.     The patient reports tolerable mucocutaneous dryness, and denies arthralgias, myalgias, depression, suicidal ideation, diarrhea, headache, or blurred vision.      Past Medical History:   Patient Active Problem List   Diagnosis     Acne vulgaris     History reviewed. No pertinent past medical history.  History reviewed. No pertinent surgical history.    Social History:  Patient reports that  has never smoked. she has never used smokeless tobacco.    Family History:  History reviewed. No pertinent family history.    Medications:  Current Outpatient Medications   Medication Sig Dispense Refill     ISOtretinoin (ACCUTANE) 40 MG capsule Take 1 capsule (40 mg) by mouth daily 30 capsule 0     ISOtretinoin (ACCUTANE) 40 MG capsule Take 1 capsule (40 mg) by mouth daily 30 capsule 0     norgestim-eth estrad triphasic (ORTHO TRI-CYCLEN LO) 0.18/0.215/0.25 MG-25 MCG tablet Take 1 tablet by mouth daily 28 tablet 3     adapalene (DIFFERIN) 0.1 % gel  Apply topically At Bedtime (Patient not taking: Reported on 1/23/2019) 15 g 3     benzoyl peroxide 5 % LIQD Use daily as directed (Patient not taking: Reported on 1/23/2019) 226 g 3     clindamycin (CLEOCIN T) 1 % lotion Apply topically every morning (Patient not taking: Reported on 1/23/2019) 60 mL 3       No Known Allergies    Review of Systems:  -Constitutional: The patient denies fatigue, fevers, chills, unintended weight loss, and night sweats.  -Neuro: no HA or vision changes  -GI: No nausea, blood in stool, diarrhea, hx of IBD  -Psych: no depression/anxiety, mood changes, or sleep problems   -Musculoskeletal: no joint or muscle pain or swelling   -Heme/Lymph: no concerning bumps, no bleeding problems  -Skin: As above in HPI. No additional skin concerns.    Physical exam:  Vitals: There were no vitals taken for this visit.  GEN: This is a well developed, well-nourished female in no acute distress, in a pleasant mood.    SKIN: Acne exam, which includes the face, neck, upper central chest, and upper central back was performed. Significant for:   - Few resolving inflammatory papules on her upper back and flanks  - Face is clear   - Pink plaques on her dorsal hands, wrists   - Ill defined scaly plaques on her bilateral upper arms  - Lips display xerosis and slight fissuring at labial commissures   -No other lesions of concern on areas examined.       Impression/Plan:  1. Acne vulgaris, on isotretinoin- at the end of month #1  Edu on avoidance of alcohol, waxing, skin lasers, tattoos, and blood donation. Reminded patient of risks regarding pregnancy. Discussed iPledge and need to  medication within 7 days of this visit. Advised to d/c all other acne medication. Reminded pt to take medication with a food containing fat.   Will continue isotretinoin 40 mg daily. May consider increasing dose at follow up if continuing to tolerate the medication well One month supply with no refills provided. Goal dose is from  150-220 mg/kg (8,250mg-12,100mg) for this 55 kg pt  Labs including CBC, GGT, BUN/Cr, fasting lipids and AST/ALT obtained on 1/19/19. Labs reviewed in Epic, found wnl  Qualitative hCG was also obtained  Contraception: OCP & condoms  Total cumulative dose 1,200 mg (21.8 mg/kg)   Patient's iPledge # is [id number]  Recommend home humidifier and regular use of emollients     2. Retinoid dermatitis, dorsal hands and bilateral upper arms    Start triamcinolone 0.1% ointment, apply BID to affected areas on hands and arms until resolved      Discussed diligent emollient use.     Follow-up in 1 month, earlier for new or changing lesions.       Staff Involved:  Scribe/Staff    Scribe Disclosure:   FRANK, Krysta Peters, am serving as a scribe to document services personally performed by Nikkie Ferrari PA-C, based on data collection and the provider's statements to me.      Provider Disclosure:   The documentation recorded by the scribe accurately reflects the services I personally performed and the decisions made by me.    All risks, benefits and alternatives were discussed with patient.  Patient is in agreement and understands the assessment and plan.  All questions were answered.  Sun Screen Education was given.   Return to Clinic in 1 months or sooner as needed.   Nikkie Ferrari PA-C   [facility] Dermatology [facility]

## 2019-02-12 ENCOUNTER — HOSPITAL ENCOUNTER (EMERGENCY)
Facility: CLINIC | Age: 19
Discharge: HOME | End: 2019-02-12
Attending: EMERGENCY MEDICINE | Admitting: EMERGENCY MEDICINE
Payer: COMMERCIAL

## 2019-02-12 VITALS
TEMPERATURE: 97.7 F | OXYGEN SATURATION: 98 % | BODY MASS INDEX: 18.65 KG/M2 | RESPIRATION RATE: 16 BRPM | HEART RATE: 55 BPM | WEIGHT: 115.52 LBS | SYSTOLIC BLOOD PRESSURE: 118 MMHG | DIASTOLIC BLOOD PRESSURE: 74 MMHG

## 2019-02-12 DIAGNOSIS — K62.5 RECTAL BLEEDING: ICD-10-CM

## 2019-02-12 DIAGNOSIS — R19.8 PAIN WITH BOWEL MOVEMENTS: ICD-10-CM

## 2019-02-12 LAB
ABO + RH BLD: NORMAL
ABO + RH BLD: NORMAL
ALBUMIN SERPL-MCNC: 4.1 G/DL (ref 3.4–5)
ALBUMIN UR-MCNC: 10 MG/DL
ALP SERPL-CCNC: 62 U/L (ref 40–150)
ALT SERPL W P-5'-P-CCNC: 19 U/L (ref 0–50)
AMPHETAMINES UR QL SCN: NEGATIVE
ANION GAP SERPL CALCULATED.3IONS-SCNC: 7 MMOL/L (ref 3–14)
APPEARANCE UR: CLEAR
AST SERPL W P-5'-P-CCNC: 19 U/L (ref 0–45)
BARBITURATES UR QL: NEGATIVE
BASOPHILS # BLD AUTO: 0 10E9/L (ref 0–0.2)
BASOPHILS NFR BLD AUTO: 0.7 %
BENZODIAZ UR QL: NEGATIVE
BILIRUB SERPL-MCNC: 0.3 MG/DL (ref 0.2–1.3)
BILIRUB UR QL STRIP: NEGATIVE
BLD GP AB SCN SERPL QL: NORMAL
BLOOD BANK CMNT PATIENT-IMP: NORMAL
BUN SERPL-MCNC: 9 MG/DL (ref 7–30)
CALCIUM SERPL-MCNC: 8.9 MG/DL (ref 8.5–10.1)
CANNABINOIDS UR QL SCN: NEGATIVE
CHLORIDE SERPL-SCNC: 104 MMOL/L (ref 94–109)
CO2 SERPL-SCNC: 28 MMOL/L (ref 20–32)
COCAINE UR QL: NEGATIVE
COLOR UR AUTO: YELLOW
CREAT SERPL-MCNC: 0.91 MG/DL (ref 0.52–1.04)
DIFFERENTIAL METHOD BLD: NORMAL
EOSINOPHIL # BLD AUTO: 0.1 10E9/L (ref 0–0.7)
EOSINOPHIL NFR BLD AUTO: 1.8 %
ERYTHROCYTE [DISTWIDTH] IN BLOOD BY AUTOMATED COUNT: 12.7 % (ref 10–15)
ETHANOL UR QL SCN: NEGATIVE
GFR SERPL CREATININE-BSD FRML MDRD: >90 ML/MIN/{1.73_M2}
GLUCOSE SERPL-MCNC: 76 MG/DL (ref 70–99)
GLUCOSE UR STRIP-MCNC: NEGATIVE MG/DL
HCG UR QL: NEGATIVE
HCT VFR BLD AUTO: 42.8 % (ref 35–47)
HGB BLD-MCNC: 13.7 G/DL (ref 11.7–15.7)
HGB UR QL STRIP: NEGATIVE
IMM GRANULOCYTES # BLD: 0 10E9/L (ref 0–0.4)
IMM GRANULOCYTES NFR BLD: 0.4 %
KETONES UR STRIP-MCNC: NEGATIVE MG/DL
LEUKOCYTE ESTERASE UR QL STRIP: NEGATIVE
LYMPHOCYTES # BLD AUTO: 1.7 10E9/L (ref 0.8–5.3)
LYMPHOCYTES NFR BLD AUTO: 31.1 %
MCH RBC QN AUTO: 29.1 PG (ref 26.5–33)
MCHC RBC AUTO-ENTMCNC: 32 G/DL (ref 31.5–36.5)
MCV RBC AUTO: 91 FL (ref 78–100)
MONOCYTES # BLD AUTO: 0.5 10E9/L (ref 0–1.3)
MONOCYTES NFR BLD AUTO: 8.4 %
MUCOUS THREADS #/AREA URNS LPF: PRESENT /LPF
NEUTROPHILS # BLD AUTO: 3.2 10E9/L (ref 1.6–8.3)
NEUTROPHILS NFR BLD AUTO: 57.6 %
NITRATE UR QL: NEGATIVE
NRBC # BLD AUTO: 0 10*3/UL
NRBC BLD AUTO-RTO: 0 /100
OPIATES UR QL SCN: NEGATIVE
PH UR STRIP: 5.5 PH (ref 5–7)
PLATELET # BLD AUTO: 277 10E9/L (ref 150–450)
POTASSIUM SERPL-SCNC: 3.9 MMOL/L (ref 3.4–5.3)
PROT SERPL-MCNC: 7.7 G/DL (ref 6.8–8.8)
RBC # BLD AUTO: 4.71 10E12/L (ref 3.8–5.2)
RBC #/AREA URNS AUTO: 0 /HPF (ref 0–2)
SODIUM SERPL-SCNC: 140 MMOL/L (ref 133–144)
SOURCE: ABNORMAL
SP GR UR STRIP: 1.02 (ref 1–1.03)
SPECIMEN EXP DATE BLD: NORMAL
SQUAMOUS #/AREA URNS AUTO: 1 /HPF (ref 0–1)
UROBILINOGEN UR STRIP-MCNC: NORMAL MG/DL (ref 0–2)
WBC # BLD AUTO: 5.6 10E9/L (ref 4–11)
WBC #/AREA URNS AUTO: 1 /HPF (ref 0–5)

## 2019-02-12 PROCEDURE — 81001 URINALYSIS AUTO W/SCOPE: CPT | Performed by: EMERGENCY MEDICINE

## 2019-02-12 PROCEDURE — 86901 BLOOD TYPING SEROLOGIC RH(D): CPT | Performed by: EMERGENCY MEDICINE

## 2019-02-12 PROCEDURE — 81025 URINE PREGNANCY TEST: CPT | Performed by: EMERGENCY MEDICINE

## 2019-02-12 PROCEDURE — 85025 COMPLETE CBC W/AUTO DIFF WBC: CPT | Performed by: EMERGENCY MEDICINE

## 2019-02-12 PROCEDURE — 99283 EMERGENCY DEPT VISIT LOW MDM: CPT | Performed by: EMERGENCY MEDICINE

## 2019-02-12 PROCEDURE — 80320 DRUG SCREEN QUANTALCOHOLS: CPT | Performed by: EMERGENCY MEDICINE

## 2019-02-12 PROCEDURE — 80307 DRUG TEST PRSMV CHEM ANLYZR: CPT | Performed by: EMERGENCY MEDICINE

## 2019-02-12 PROCEDURE — 80053 COMPREHEN METABOLIC PANEL: CPT | Performed by: EMERGENCY MEDICINE

## 2019-02-12 PROCEDURE — 86900 BLOOD TYPING SEROLOGIC ABO: CPT | Performed by: EMERGENCY MEDICINE

## 2019-02-12 PROCEDURE — 86850 RBC ANTIBODY SCREEN: CPT | Performed by: EMERGENCY MEDICINE

## 2019-02-12 PROCEDURE — 99283 EMERGENCY DEPT VISIT LOW MDM: CPT | Mod: Z6 | Performed by: EMERGENCY MEDICINE

## 2019-02-12 SDOH — HEALTH STABILITY: MENTAL HEALTH: HOW OFTEN DO YOU HAVE A DRINK CONTAINING ALCOHOL?: NEVER

## 2019-02-12 ASSESSMENT — ENCOUNTER SYMPTOMS
FEVER: 0
RECTAL PAIN: 1
HEADACHES: 0
CONFUSION: 0
SHORTNESS OF BREATH: 0
COLOR CHANGE: 0
CONSTIPATION: 0
ABDOMINAL PAIN: 0
ARTHRALGIAS: 0
DIFFICULTY URINATING: 0
NECK STIFFNESS: 0
BLOOD IN STOOL: 1
ANAL BLEEDING: 1
EYE REDNESS: 0

## 2019-02-12 NOTE — ED TRIAGE NOTES
Pt presents ambulatory to triage from home. Pt stats for past 2 weeks has had rectal bleeding that has become increased over past 2 days. Pt also c/o rectal pain. Denies injury to rectum. States is currently having period as well. Pt has been taking Accutane for past 2 months and stopped taking yesterday.

## 2019-02-12 NOTE — LETTER
February 12, 2019      To Whom It May Concern:      Sabra Mtz was seen in our Emergency Department today, 02/12/19.  I expect her condition to improve over the next 1 days.  She may return to work when improved.    Sincerely,        Arias Vasquez, DO

## 2019-02-12 NOTE — ED NOTES
Initial Assessment: VSS. Communicates needs without difficulty. Pleasant and co-op. Denies SOB. Denies chest/shoulder/arm pain or discomfort. No acute distress noted.

## 2019-02-12 NOTE — ED AVS SNAPSHOT
[facility], [place], Emergency Department  [address] [place] [id number]  Phone:  [phone]                                    Sabra Mtz   MRN: [id number]    Department:  [facility], Emergency Department   Date of Visit:  2/12/2019           After Visit Summary Signature Page    I have received my discharge instructions, and my questions have been answered. I have discussed any challenges I see with this plan with the nurse or doctor.    ..........................................................................................................................................  Patient/Patient Representative Signature      ..........................................................................................................................................  Patient Representative Print Name and Relationship to Patient    ..................................................               ................................................  Date                                   Time    ..........................................................................................................................................  Reviewed by Signature/Title    ...................................................              ..............................................  Date                                               Time          22EPIC Rev 08/18

## 2019-02-12 NOTE — ED PROVIDER NOTES
"    [place] EMERGENCY DEPARTMENT ([facility])  2/12/19   History     Chief Complaint   Patient presents with     Rectal Bleeding     The history is provided by the patient.     Sabra Mtz is an otherwise healthy 19 year old female, who presents to the Emergency Department for evaluation of anal bleeding, blood in stool, and rectal pain.   She reports that 2 weeks ago she started to experience intermittant blood in her stool.  She states that over the last 2 days this has worsened and become chronic with bright red stool.  She states that she has blood mixed in with her stool and on the toilet paper when she wipes. She has pain when passing stools and feels a sensation of, \"my skin is tearing\".  She states that she has not had these symptoms before.  She denies recent trauma.  She denies lightheadedness, dizziness, chest pain, prior constipation, or abdominal pain. The patient reports that she has been on Accutane for 2 months for treatment of acne vulgaris and that she has been suffering from dry, irritated skin and lips.  She is concerned her new symptoms are related to the Accutane.  She denies taking any medication except for the Accutane, ointment for dry skin birth control.  She has no other symptoms at this time.    I have reviewed the Medications, Allergies, Past Medical and Surgical History, and Social History in the Epic system.    History reviewed. No pertinent past medical history.    History reviewed. No pertinent surgical history.    History reviewed. No pertinent family history.    Social History     Tobacco Use     Smoking status: Never Smoker     Smokeless tobacco: Never Used   Substance Use Topics     Alcohol use: No     Frequency: Never       No current facility-administered medications for this encounter.      Current Outpatient Medications   Medication     ISOtretinoin (ACCUTANE) 40 MG capsule     norgestim-eth estrad triphasic (ORTHO TRI-CYCLEN LO) 0.18/0.215/0.25 MG-25 MCG tablet "     triamcinolone (KENALOG) 0.1 % external ointment      No Known Allergies     Review of Systems   Constitutional: Negative for fever.   HENT: Negative for congestion.    Eyes: Negative for redness.   Respiratory: Negative for shortness of breath.    Cardiovascular: Negative for chest pain.   Gastrointestinal: Positive for anal bleeding, blood in stool and rectal pain. Negative for abdominal pain and constipation.   Genitourinary: Negative for difficulty urinating.   Musculoskeletal: Negative for arthralgias and neck stiffness.   Skin: Negative for color change.        Positive for skin and lip dryness and irritation with Accutane treatment   Neurological: Negative for headaches.   Psychiatric/Behavioral: Negative for confusion.   All other systems reviewed and are negative.      Physical Exam   BP: 138/89  Pulse: 75  Temp: 97.7  F (36.5  C)  Resp: 16  Weight: 52.4 kg (115 lb 8.3 oz)  SpO2: 98 %      Physical Exam   Constitutional: She is oriented to person, place, and time. She appears well-developed and well-nourished. No distress.   HENT:   Head: Normocephalic and atraumatic.   Mouth/Throat: No oropharyngeal exudate.   Eyes: Pupils are equal, round, and reactive to light. Right eye exhibits no discharge. Left eye exhibits no discharge. No scleral icterus.   Neck: Normal range of motion. Neck supple.   Cardiovascular: Normal rate, regular rhythm, normal heart sounds and intact distal pulses. Exam reveals no gallop and no friction rub.   No murmur heard.  Pulmonary/Chest: Effort normal and breath sounds normal. No respiratory distress. She has no wheezes. She exhibits no tenderness.   Abdominal: Soft. Bowel sounds are normal. She exhibits no distension. There is no tenderness.   Genitourinary: Rectal exam shows guaiac positive stool.   Musculoskeletal: Normal range of motion. She exhibits no edema, tenderness or deformity.   Neurological: She is alert and oriented to person, place, and time. No cranial nerve  deficit.   Skin: Skin is warm and dry. No rash noted. She is not diaphoretic. No erythema. No pallor.   Psychiatric: She has a normal mood and affect.   Nursing note and vitals reviewed.      ED Course   11:19 AM  The patient was seen and examined by Arias Vasquez DO in Room ED07.       Procedures             Critical Care time:  none             Labs Ordered and Resulted from Time of ED Arrival Up to the Time of Departure from the ED - No data to display         Assessments & Plan (with Medical Decision Making)   This is a 19-year-old female who presents with 2 weeks of rectal bleeding.  Patient notes blood mixed with her stool as well as on the toilet paper.  This has been slowly progressive for the past 2 weeks.  She also notes pain when having a bowel movement.  She has not had any similar occurrences in the past.  Patient denies any trauma to the area or constipation.  She is currently taking Accutane.  On exam, Hemoccult was trace positive. No active, bleeding hemorrhoids.  Lab work showed no acute abnormalities. Patients symptoms appear to be consistent with an anal fissure. I discussed the case with dermatology who states that Accutane could contribute to risk for fissure formation. They also state that Accutane could unmask early inflammatory bowel disease. I discussed this with patient. Inflammatory bowel disease is less likely as patient does not have any other similar symptoms of this but it could be a possibility. Dermatology that patient could stop her Accutane or continue, depending on her preference. I discussed all this with patient who opts to continue the medication. We will provide a stool softener and topical lidocaine. Will discharge home with return precautions. Discussed reasons to return to the emergency department.  Patient understands and agrees with this plan.     I have reviewed the nursing notes.    I have reviewed the findings, diagnosis, plan and need for follow up with the  patient.       Medication List      There are no discharge medications for this visit.         Final diagnoses:   None     Efrain MARIE, am serving as a trained medical scribe to document services personally performed by Arias Vasquez DO, based on the provider's statements to me.   Arias MARIE DO, was physically present and have reviewed and verified the accuracy of this note documented by Efrain Leung.     2/12/2019   [facility], [place], EMERGENCY DEPARTMENT     Arias Vasquez DO  02/12/19 [id number]

## 2019-02-20 DIAGNOSIS — Z79.899 ON ISOTRETINOIN THERAPY: ICD-10-CM

## 2019-02-20 DIAGNOSIS — L70.0 ACNE VULGARIS: ICD-10-CM

## 2019-02-20 LAB
ALT SERPL W P-5'-P-CCNC: 16 U/L (ref 0–50)
AST SERPL W P-5'-P-CCNC: 17 U/L (ref 0–45)
HCG UR QL: NEGATIVE
TRIGL SERPL-MCNC: 94 MG/DL

## 2019-02-22 ENCOUNTER — OFFICE VISIT (OUTPATIENT)
Dept: DERMATOLOGY | Facility: CLINIC | Age: 19
End: 2019-02-22
Payer: COMMERCIAL

## 2019-02-22 DIAGNOSIS — L70.0 ACNE VULGARIS: ICD-10-CM

## 2019-02-22 DIAGNOSIS — Z79.899 ON ISOTRETINOIN THERAPY: Primary | ICD-10-CM

## 2019-02-22 RX ORDER — ISOTRETINOIN 40 MG/1
40 CAPSULE ORAL DAILY
Qty: 30 CAPSULE | Refills: 0 | Status: SHIPPED | OUTPATIENT
Start: 2019-02-22 | End: 2019-03-26

## 2019-02-22 ASSESSMENT — PAIN SCALES - GENERAL: PAINLEVEL: NO PAIN (0)

## 2019-02-22 NOTE — LETTER
RE: Sabra Mtz  [address]  [address]  [place] [address]     Dear Colleague,    Thank you for referring your patient, Sabra Mtz, to the [facility] DERMATOLOGY at [facility]. Please see a copy of my visit note below.    [facility] Dermatology Note      Dermatology Problem List:  1. Acne vulgaris: refractory to topicals, OCPs, and doxycycline, with scarring. Starting isotretinoin 40 mg daily (12/17/18)- at the end of month # 2  - iPLEDGE # [id number], birth control: OCPs and condoms    Encounter Date: Feb 22, 2019    CC:  Chief Complaint   Patient presents with     Derm Problem     Accutane follow up.     History of Present Illness:  Ms. Sabra Mtz is a 19 year old female who presents today in follow up for Accutane. The patient was last seen in the dermatology clinic on 01/23/19 during which she continued isotretinoin 40 mg daily. Today she is at the end of month # 2. She reports that he skin is doing well. Fewer acne break outs, with more superficial pimples. She stopped accutane for a few days earlier this month, due to issues with an anal fissure and bloody stools, and noted increased acne after a few days. Improved acne upon restarting the accutane.     Reports tolerable discomfort with defecating, with fewer bloody stools. The patient reports tolerable mucocutaneous dryness, and denies arthralgias, myalgias, depression, suicidal ideation, diarrhea, headache, or blurred vision.      Past Medical History:   Patient Active Problem List   Diagnosis     Acne vulgaris     No past medical history on file.  No past surgical history on file.    Social History:  Patient reports that  has never smoked. she has never used smokeless tobacco. She reports that she does not drink alcohol or use drugs.    Family History:  No family history on file.    Medications:  Current Outpatient Medications   Medication Sig Dispense Refill     docusate  sodium (COLACE) 50 MG capsule Take 1 capsule (50 mg) by mouth 2 times daily for 10 days 20 capsule 0     ISOtretinoin (ACCUTANE) 40 MG capsule Take 1 capsule (40 mg) by mouth daily 30 capsule 0     lidocaine (XYLOCAINE) 2 % external gel Apply topically as needed for moderate pain 30 mL 0     norgestim-eth estrad triphasic (ORTHO TRI-CYCLEN LO) 0.18/0.215/0.25 MG-25 MCG tablet Take 1 tablet by mouth daily 28 tablet 3     triamcinolone (KENALOG) 0.1 % external ointment Apply topically 2 times daily To areas of dermatitis (arms, lips) until clear. (avoid using on remaining face, groin or axilla) 80 g 0     No Known Allergies    Physical exam:  Vitals: LMP 02/11/2019   GEN: This is a well developed, well-nourished female in no acute distress, in a pleasant mood.    SKIN: Acne exam, which includes the face, neck, upper central chest, and upper central back was performed. Significant for:   - Resolving acneiform on her right cheek  - 3 resolving papules on her lower back  - Remaining face is clear   -No other lesions of concern on areas examined.     Impression/Plan:  1. Acne vulgaris, on isotretinoin- at the end of month #2  Edu on avoidance of alcohol, waxing, skin lasers, tattoos, and blood donation. Reminded patient of risks regarding pregnancy. Discussed iPledge and need to  medication within 7 days of this visit. Advised to d/c all other acne medication. Reminded pt to take medication with a food containing fat.   As she has been experiencing anal fissures and stool changes, will continue isotretinoin 40 mg daily. If her symptoms continue to improve, will consider increasing dose to 60 mg daily.One month supply with no refills provided. Goal dose is from 150-220 mg/kg (8,250mg-12,100mg) for this 55 kg pt  Labs including fasting triglycerides and AST/ALT obtained on 2/20/19. Labs reviewed in [facility], found wnl.   Qualitative hCG was also obtained  Contraception: OCP & condoms  Total cumulative dose 1,200 mg (43.6  mg/kg)   Patient's iPledge # is [id number]    Follow-up in 1 month, earlier for new or changing lesions.       Staff Involved:  Scribe/Staff    Scribe Disclosure:   I, Krysta Peters, am serving as a scribe to document services personally performed by Nikkei Ferrari PA-C, based on data collection and the provider's statements to me.    Provider Disclosure:   The documentation recorded by the scribe accurately reflects the services I personally performed and the decisions made by me.    All risks, benefits and alternatives were discussed with patient.  Patient is in agreement and understands the assessment and plan.  All questions were answered.  Sun Screen Education was given.   Return to Clinic in 1 months or sooner as needed.     Again, thank you for allowing me to participate in the care of your patient.      Sincerely,    Nikkie Ferrari PA-C

## 2019-02-22 NOTE — PROGRESS NOTES
[facility] Dermatology Note      Dermatology Problem List:  1. Acne vulgaris: refractory to topicals, OCPs, and doxycycline, with scarring. Starting isotretinoin 40 mg daily (12/17/18)- at the end of month # 2  - iPLEDGE # [id number], birth control: OCPs and condoms    Encounter Date: Feb 22, 2019    CC:  Chief Complaint   Patient presents with     Derm Problem     Accutane follow up.     History of Present Illness:  Ms. Sabra Mtz is a 19 year old female who presents today in follow up for Accutane. The patient was last seen in the dermatology clinic on 01/23/19 during which she continued isotretinoin 40 mg daily. Today she is at the end of month # 2. She reports that he skin is doing well. Fewer acne break outs, with more superficial pimples. She stopped accutane for a few days earlier this month, due to issues with an anal fissure and bloody stools, and noted increased acne after a few days. Improved acne upon restarting the accutane.     Reports tolerable discomfort with defecating, with fewer bloody stools. The patient reports tolerable mucocutaneous dryness, and denies arthralgias, myalgias, depression, suicidal ideation, diarrhea, headache, or blurred vision.      Past Medical History:   Patient Active Problem List   Diagnosis     Acne vulgaris     No past medical history on file.  No past surgical history on file.    Social History:  Patient reports that  has never smoked. she has never used smokeless tobacco. She reports that she does not drink alcohol or use drugs.    Family History:  No family history on file.    Medications:  Current Outpatient Medications   Medication Sig Dispense Refill     docusate sodium (COLACE) 50 MG capsule Take 1 capsule (50 mg) by mouth 2 times daily for 10 days 20 capsule 0     ISOtretinoin (ACCUTANE) 40 MG capsule Take 1 capsule (40 mg) by mouth daily 30 capsule 0     lidocaine (XYLOCAINE) 2 % external gel Apply topically as needed for moderate pain 30  mL 0     norgestim-eth estrad triphasic (ORTHO TRI-CYCLEN LO) 0.18/0.215/0.25 MG-25 MCG tablet Take 1 tablet by mouth daily 28 tablet 3     triamcinolone (KENALOG) 0.1 % external ointment Apply topically 2 times daily To areas of dermatitis (arms, lips) until clear. (avoid using on remaining face, groin or axilla) 80 g 0       No Known Allergies    Review of Systems:  -Constitutional: The patient denies fatigue, fevers, chills, unintended weight loss, and night sweats.  -Neuro: no HA or vision changes  -GI: No nausea, blood in stool, diarrhea, hx of IBD  -Psych: no depression/anxiety, mood changes, or sleep problems   -Musculoskeletal: no joint or muscle pain or swelling   -Heme/Lymph: no concerning bumps, no bleeding problems  -Skin: As above in HPI. No additional skin concerns.    Physical exam:  Vitals: LMP 02/11/2019   GEN: This is a well developed, well-nourished female in no acute distress, in a pleasant mood.    SKIN: Acne exam, which includes the face, neck, upper central chest, and upper central back was performed. Significant for:   - Resolving acneiform on her right cheek  - 3 resolving papules on her lower back  - Remaining face is clear   -No other lesions of concern on areas examined.     Impression/Plan:  1. Acne vulgaris, on isotretinoin- at the end of month #2  Edu on avoidance of alcohol, waxing, skin lasers, tattoos, and blood donation. Reminded patient of risks regarding pregnancy. Discussed iPledge and need to  medication within 7 days of this visit. Advised to d/c all other acne medication. Reminded pt to take medication with a food containing fat.   As she has been experiencing anal fissures and stool changes, will continue isotretinoin 40 mg daily. If her symptoms continue to improve, will consider increasing dose to 60 mg daily.One month supply with no refills provided. Goal dose is from 150-220 mg/kg (8,250mg-12,100mg) for this 55 kg pt  Labs including fasting triglycerides and  AST/ALT obtained on 2/20/19. Labs reviewed in [facility], found wnl.   Qualitative hCG was also obtained  Contraception: OCP & condoms  Total cumulative dose 1,200 mg (43.6 mg/kg)   Patient's iPledge # is [id number]    Follow-up in 1 month, earlier for new or changing lesions.       Staff Involved:  Scribe/Staff    Scribe Disclosure:   I, Krysta Peters, am serving as a scribe to document services personally performed by Nikkie Ferrari PA-C, based on data collection and the provider's statements to me.    Provider Disclosure:   The documentation recorded by the scribe accurately reflects the services I personally performed and the decisions made by me.    All risks, benefits and alternatives were discussed with patient.  Patient is in agreement and understands the assessment and plan.  All questions were answered.  Sun Screen Education was given.   Return to Clinic in 1 months or sooner as needed.   Nikkie Ferrari PA-C   [facility] Dermatology Clinic

## 2019-02-22 NOTE — NURSING NOTE
Dermatology Rooming Note    Sabra Mtz's goals for this visit include:   Chief Complaint   Patient presents with     Derm Problem     Accutane follow up.     Olya Mckenna LPN

## 2019-03-26 ENCOUNTER — OFFICE VISIT (OUTPATIENT)
Dept: DERMATOLOGY | Facility: CLINIC | Age: 19
End: 2019-03-26
Payer: COMMERCIAL

## 2019-03-26 DIAGNOSIS — L70.0 ACNE VULGARIS: Primary | ICD-10-CM

## 2019-03-26 DIAGNOSIS — Z79.899 ON ISOTRETINOIN THERAPY: ICD-10-CM

## 2019-03-26 DIAGNOSIS — L70.0 ACNE VULGARIS: ICD-10-CM

## 2019-03-26 LAB — HCG UR QL: NEGATIVE

## 2019-03-26 RX ORDER — ISOTRETINOIN 40 MG/1
40 CAPSULE ORAL DAILY
Qty: 30 CAPSULE | Refills: 0 | Status: SHIPPED | OUTPATIENT
Start: 2019-03-26 | End: 2019-04-30

## 2019-03-26 RX ORDER — ISOTRETINOIN 20 MG/1
20 CAPSULE ORAL DAILY
Qty: 30 CAPSULE | Refills: 0 | Status: SHIPPED | OUTPATIENT
Start: 2019-03-26 | End: 2019-04-30

## 2019-03-26 ASSESSMENT — PAIN SCALES - GENERAL: PAINLEVEL: NO PAIN (0)

## 2019-03-26 NOTE — LETTER
3/26/2019       RE: Sabra Mtz  [address]  [address]  [place] [address]     Dear Colleague,    Thank you for referring your patient, Sabra Mtz, to the [facility] DERMATOLOGY at [facility]. Please see a copy of my visit note below.    [facility] Dermatology Note      Dermatology Problem List:  1. Acne vulgaris: refractory to topicals, OCPs, and doxycycline, with scarring. Starting isotretinoin 40 mg daily (12/17/18)- at the end of month # 3  - iPLEDGE # [id number], birth control: OCPs and condoms    Encounter Date: Mar 26, 2019    CC:  Chief Complaint   Patient presents with     Accutane     Sabra is here today for a accutane follow up - Everything is great.      History of Present Illness:  Ms. Sabra Mtz is a 19 year old female who presents today in follow up for Accutane. The patient was last seen in the dermatology clinic on 02/22/19 during which she continued isotretinoin 40 mg daily. Today she is at the end of month # 3. She reports her skin is doing very well. No significant break outs over the last month, slow improvements in her previous acne.    She is no longer using stool softeners, as her stools have returned to baseline. No additional symptoms of anal fissuring or bloody stools. The patient reports tolerable mucocutaneous dryness, and denies arthralgias, myalgias, depression, suicidal ideation, diarrhea, headache, or blurred vision.      Past Medical History:   Patient Active Problem List   Diagnosis     Acne vulgaris     No past medical history on file.  No past surgical history on file.    Social History:  Patient reports that  has never smoked. she has never used smokeless tobacco. She reports that she does not drink alcohol or use drugs.    Family History:  No family history on file.    Medications:  Current Outpatient Medications   Medication Sig Dispense Refill     ISOtretinoin (ACCUTANE) 40 MG capsule Take 1  capsule (40 mg) by mouth daily 30 capsule 0     norgestim-eth estrad triphasic (ORTHO TRI-CYCLEN LO) 0.18/0.215/0.25 MG-25 MCG tablet Take 1 tablet by mouth daily 28 tablet 3     triamcinolone (KENALOG) 0.1 % external ointment Apply topically 2 times daily To areas of dermatitis (arms, lips) until clear. (avoid using on remaining face, groin or axilla) 80 g 0     lidocaine (XYLOCAINE) 2 % external gel Apply topically as needed for moderate pain 30 mL 0       No Known Allergies    Review of Systems:  -Constitutional: The patient denies fatigue, fevers, chills, unintended weight loss, and night sweats.  -Neuro: no HA or vision changes  -GI: No nausea, blood in stool, diarrhea, hx of IBD  -Psych: no depression/anxiety, mood changes, or sleep problems   -Musculoskeletal: no joint or muscle pain or swelling   -Heme/Lymph: no concerning bumps, no bleeding problems  -Skin: As above in HPI. No additional skin concerns.    Physical exam:  Vitals: There were no vitals taken for this visit.  GEN: This is a well developed, well-nourished female in no acute distress, in a pleasant mood.    SKIN: Acne exam, which includes the face, neck, upper central chest, and upper central back was performed. Significant for:   - Resolving acneiform on her right cheek  - 1 excoriated papule on the central upper chest  - Remaining face is clear   -No other lesions of concern on areas examined.     Impression/Plan:  1. Acne vulgaris, on isotretinoin- at the end of month #3  Edu on avoidance of alcohol, waxing, skin lasers, tattoos, and blood donation. Reminded patient of risks regarding pregnancy. Discussed iPledge and need to  medication within 7 days of this visit. Advised to d/c all other acne medication. Reminded pt to take medication with a food containing fat.   As she is tolerating the medication well, will increase dose of isotretinoin to 60 mg daily. One month supply with no refills provided. Goal dose is from 150-220 mg/kg  (8,250mg-12,100mg) for this 55 kg pt  Previous labs reviewed in Epic, wnl. No additional blood monitoring required.   Qualitative hCG was also obtained  Contraception: OCP & condoms  Total cumulative dose 3,600 mg (65.4 mg/kg)   Patient's iPledge # is [id number]    Follow-up in 1 month, earlier for new or changing lesions.     Staff Involved:  Scribe/Staff    Scribe Disclosure:   I, Krysta Peters, am serving as a scribe to document services personally performed by Nikkie Ferrari PA-C, based on data collection and the provider's statements to me.    Provider Disclosure:   The documentation recorded by the scribe accurately reflects the services I personally performed and the decisions made by me.    All risks, benefits and alternatives were discussed with patient.  Patient is in agreement and understands the assessment and plan.  All questions were answered.  Sun Screen Education was given.   Return to Clinic in 1 month or sooner as needed.     Nikkie Ferrari PA-C   [facility] Dermatology Clinic

## 2019-03-26 NOTE — PROGRESS NOTES
[facility] Dermatology Note      Dermatology Problem List:  1. Acne vulgaris: refractory to topicals, OCPs, and doxycycline, with scarring. Starting isotretinoin 40 mg daily (12/17/18)- at the end of month # 3  - iPLEDGE # [id number], birth control: OCPs and condoms    Encounter Date: Mar 26, 2019    CC:  Chief Complaint   Patient presents with     Accutane     Sabra is here today for a accutane follow up - Everything is great.      History of Present Illness:  Ms. Sabra Mtz is a 19 year old female who presents today in follow up for Accutane. The patient was last seen in the dermatology clinic on 02/22/19 during which she continued isotretinoin 40 mg daily. Today she is at the end of month # 3. She reports her skin is doing very well. No significant break outs over the last month, slow improvements in her previous acne.    She is no longer using stool softeners, as her stools have returned to baseline. No additional symptoms of anal fissuring or bloody stools. The patient reports tolerable mucocutaneous dryness, and denies arthralgias, myalgias, depression, suicidal ideation, diarrhea, headache, or blurred vision.      Past Medical History:   Patient Active Problem List   Diagnosis     Acne vulgaris     No past medical history on file.  No past surgical history on file.    Social History:  Patient reports that  has never smoked. she has never used smokeless tobacco. She reports that she does not drink alcohol or use drugs.    Family History:  No family history on file.    Medications:  Current Outpatient Medications   Medication Sig Dispense Refill     ISOtretinoin (ACCUTANE) 40 MG capsule Take 1 capsule (40 mg) by mouth daily 30 capsule 0     norgestim-eth estrad triphasic (ORTHO TRI-CYCLEN LO) 0.18/0.215/0.25 MG-25 MCG tablet Take 1 tablet by mouth daily 28 tablet 3     triamcinolone (KENALOG) 0.1 % external ointment Apply topically 2 times daily To areas of dermatitis (arms, lips) until  clear. (avoid using on remaining face, groin or axilla) 80 g 0     lidocaine (XYLOCAINE) 2 % external gel Apply topically as needed for moderate pain 30 mL 0       No Known Allergies    Review of Systems:  -Constitutional: The patient denies fatigue, fevers, chills, unintended weight loss, and night sweats.  -Neuro: no HA or vision changes  -GI: No nausea, blood in stool, diarrhea, hx of IBD  -Psych: no depression/anxiety, mood changes, or sleep problems   -Musculoskeletal: no joint or muscle pain or swelling   -Heme/Lymph: no concerning bumps, no bleeding problems  -Skin: As above in HPI. No additional skin concerns.    Physical exam:  Vitals: There were no vitals taken for this visit.  GEN: This is a well developed, well-nourished female in no acute distress, in a pleasant mood.    SKIN: Acne exam, which includes the face, neck, upper central chest, and upper central back was performed. Significant for:   - Resolving acneiform on her right cheek  - 1 excoriated papule on the central upper chest  - Remaining face is clear   -No other lesions of concern on areas examined.     Impression/Plan:  1. Acne vulgaris, on isotretinoin- at the end of month #3  Edu on avoidance of alcohol, waxing, skin lasers, tattoos, and blood donation. Reminded patient of risks regarding pregnancy. Discussed iPledge and need to  medication within 7 days of this visit. Advised to d/c all other acne medication. Reminded pt to take medication with a food containing fat.   As she is tolerating the medication well, will increase dose of isotretinoin to 60 mg daily. One month supply with no refills provided. Goal dose is from 150-220 mg/kg (8,250mg-12,100mg) for this 55 kg pt  Previous labs reviewed in Epic, wnl. No additional blood monitoring required.   Qualitative hCG was also obtained  Contraception: OCP & condoms  Total cumulative dose 3,600 mg (65.4 mg/kg)   Patient's iPledge # is [id number]    Follow-up in 1 month, earlier for new  or changing lesions.     Staff Involved:  Scribe/Staff    Scribe Disclosure:   I, Krysta Peters, am serving as a scribe to document services personally performed by Nikkie Ferrari PA-C, based on data collection and the provider's statements to me.    Provider Disclosure:   The documentation recorded by the scribe accurately reflects the services I personally performed and the decisions made by me.    All risks, benefits and alternatives were discussed with patient.  Patient is in agreement and understands the assessment and plan.  All questions were answered.  Sun Screen Education was given.   Return to Clinic in 1 month or sooner as needed.   Nikkie Ferrari PA-C   [facility]

## 2019-03-26 NOTE — NURSING NOTE
Dermatology Rooming Note    Sabra Mtz's goals for this visit include:   Chief Complaint   Patient presents with     Accutane     Sabra is here today for a accutane follow up - Everything is great.      TITA Mcgrath

## 2019-04-19 ENCOUNTER — HEALTH MAINTENANCE LETTER (OUTPATIENT)
Age: 19
End: 2019-04-19

## 2019-04-30 ENCOUNTER — OFFICE VISIT (OUTPATIENT)
Dept: DERMATOLOGY | Facility: CLINIC | Age: 19
End: 2019-04-30
Payer: COMMERCIAL

## 2019-04-30 ENCOUNTER — TELEPHONE (OUTPATIENT)
Dept: DERMATOLOGY | Facility: CLINIC | Age: 19
End: 2019-04-30

## 2019-04-30 DIAGNOSIS — L70.0 ACNE VULGARIS: Primary | ICD-10-CM

## 2019-04-30 DIAGNOSIS — L70.0 ACNE VULGARIS: ICD-10-CM

## 2019-04-30 DIAGNOSIS — Z79.899 ON ISOTRETINOIN THERAPY: ICD-10-CM

## 2019-04-30 LAB — HCG UR QL: NEGATIVE

## 2019-04-30 RX ORDER — NORGESTIMATE AND ETHINYL ESTRADIOL 7DAYSX3 LO
1 KIT ORAL DAILY
Qty: 28 TABLET | Refills: 3 | Status: SHIPPED | OUTPATIENT
Start: 2019-04-30

## 2019-04-30 RX ORDER — ISOTRETINOIN 40 MG/1
40 CAPSULE ORAL 2 TIMES DAILY
Qty: 60 CAPSULE | Refills: 0 | Status: SHIPPED | OUTPATIENT
Start: 2019-04-30

## 2019-04-30 RX ORDER — ISOTRETINOIN 20 MG/1
20 CAPSULE ORAL DAILY
Qty: 30 CAPSULE | Refills: 0 | Status: SHIPPED | OUTPATIENT
Start: 2019-04-30

## 2019-04-30 ASSESSMENT — PAIN SCALES - GENERAL: PAINLEVEL: NO PAIN (0)

## 2019-04-30 NOTE — LETTER
4/30/2019       RE: Milena Mtz  [address]  [address]  [place] [address]     Dear Colleague,    Thank you for referring your patient, Milena Mtz, to the [facility] DERMATOLOGY at [facility]. Please see a copy of my visit note below.    [facility] Dermatology Note      Dermatology Problem List:  1. Acne vulgaris: refractory to topicals, OCPs, and doxycycline, with scarring. Started isotretinoin 40 mg daily (12/17/18)- at the end of month #  4  - iPLEDGE # [id number], birth control: OCPs and condoms      Encounter Date: Apr 30, 2019    CC:  Chief Complaint   Patient presents with     Accutane     milena is here today for an accutane follow up.          History of Present Illness:  Ms. Milena Mtz is a 19 year old female who presents as a follow-up for acne. The patient was last seen 3/26/19 when isotretinoin was increased to 60 mg daily. Today she is at the end of month # 4. She reports her skin is doing very well. No significant break outs over the last month, slow improvements in her previous acne. States her back is totally clear. She noticed a few new breakouts this month just before she got her period. 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 Patient also mentions that she would like a refill of her birth control pills.    The patient reports tolerable mucocutaneous dryness, and denies arthralgias, myalgias, depression, suicidal ideation, diarrhea, headache, or blurred vision.        Past Medical History:   Patient Active Problem List   Diagnosis     Acne vulgaris     No past medical history on file.  No past surgical history on file.    Social History:   reports that she has never smoked. She has never used smokeless tobacco. She reports that she does not drink alcohol or use drugs.   -[employer] agency internship     Family History:  No family history on file.    Medications:  Current  Outpatient Medications   Medication Sig Dispense Refill     ISOtretinoin (ACCUTANE) 20 MG capsule Take 1 capsule (20 mg) by mouth daily 30 capsule 0     ISOtretinoin (ACCUTANE) 40 MG capsule Take 1 capsule (40 mg) by mouth daily 30 capsule 0     norgestim-eth estrad triphasic (ORTHO TRI-CYCLEN LO) 0.18/0.215/0.25 MG-25 MCG tablet Take 1 tablet by mouth daily 28 tablet 3     triamcinolone (KENALOG) 0.1 % external ointment Apply topically 2 times daily To areas of dermatitis (arms, lips) until clear. (avoid using on remaining face, groin or axilla) 80 g 0     lidocaine (XYLOCAINE) 2 % external gel Apply topically as needed for moderate pain 30 mL 0       No Known Allergies    Review of Systems:  -Neuro: no HA or vision changes  -GI: No nausea, blood in stool, diarrhea, hx of IBD  -Psych: no depression/anxiety, mood changes, or sleep problems   -Musculoskeletal: no joint or muscle pain or swelling   -Heme/Lymph: no concerning bumps, no bleeding problems  -Constitutional: The patient denies fatigue, fevers, chills, unintended weight loss, and night sweats.  -HEENT: Patient denies nonhealing oral sores.  -Skin: As above in HPI. No additional skin concerns.    Physical exam:  Vitals: There were no vitals taken for this visit.  GEN: This is a well developed, well-nourished female in no acute distress, in a pleasant mood.    SKIN: Acne exam, which includes the face, neck, upper central chest, and upper central back was performed.  SKIN: Acne exam, which includes the face, neck, upper central chest, and upper central back was performed. Significant for:   - Resolving acneiform on her right cheek  - Remaining face is clear   -upper back is clear with some resolving hyperpigmented macules  -No other lesions of concern on areas examined.       Impression/Plan:  1. Acne vulgaris, on isotretinoin - at the end of month #4    Edu on avoidance of alcohol, waxing, skin lasers, tattoos, and blood donation. Reminded patient of risks  regarding pregnancy. Discussed iPledge and need to  medication within 7 days of this visit. Advised to d/c all other acne medication. Reminded pt to take medication with a food containing fat.     As she is tolerating the medication well, will increase dose of isotretinoin to 100 mg daily.  Patient will be taking 60 mg for the first 30 days, but than take 40 mg pills for the second month as she will be away in [place]. One month supply with no refills provided. Goal dose is from 150-220 mg/kg (8,250mg-12,100mg) for this 55 kg pt    Previous labs reviewed in Epic, wnl. No additional blood monitoring required.     Qualitative hCG was also obtained    Contraception: OCP & condoms - patient is also abstinent    Total cumulative dose  5,400 mg (98.2.4 mg/kg)     Patient's iPledge # is [id number]    Continue ORTHO TRI-CYCLEN, take 1 tablet by mouth daily, - was refilled at today's visit - this will help with hormonally driven break outs    CC Dr. Be on close of this encounter.  Follow-up in 4 months, earlier for new or changing lesions.       Staff Involved:  Staff/Scribe    Scribe Disclosure:  I, Mary Ozuna, am serving as a scribe to document services personally performed by Ivon Nolan PA-C, based on data collection and the provider's statements to me.     Provider Disclosure:   The documentation recorded by the scribe accurately reflects the services I personally performed and the decisions made by me.    All risks, benefits and alternatives were discussed with patient.  Patient is in agreement and understands the assessment and plan.  All questions were answered.    Ivon Nolan PA-C  [facility]: Phone: [phone], Fax: [phone]

## 2019-04-30 NOTE — TELEPHONE ENCOUNTER
Health Call Center    Phone Message    May a detailed message be left on voicemail: yes    Reason for Call: Other: Patient missed her Accutane follow up appt Friday, 4/26/19.  She sees Vonda, nothing available until June.  Please contact patient.  BUT she scheduled labs for today (at her request).    Action Taken: Message routed to:  [facility] ([facility] Dermatology

## 2019-04-30 NOTE — TELEPHONE ENCOUNTER
I called the patient and offered her an appointment with Ivon Nolan today. The patient will be seen today at 1245pm.   Juli Pederson CMA

## 2019-04-30 NOTE — PROGRESS NOTES
[facility] Dermatology Note      Dermatology Problem List:  1. Acne vulgaris: refractory to topicals, OCPs, and doxycycline, with scarring. Started isotretinoin 40 mg daily (12/17/18)- at the end of month # 4  - iPLEDGE # [id number], birth control: OCPs and condoms      Encounter Date: Apr 30, 2019    CC:  Chief Complaint   Patient presents with     Accutane     milena is here today for an accutane follow up.          History of Present Illness:  Ms. Milena Mtz is a 19 year old female who presents as a follow-up for acne. The patient was last seen 3/26/19 when isotretinoin was increased to 60 mg daily. Today she is at the end of month # 4. She reports her skin is doing very well. No significant break outs over the last month, slow improvements in her previous acne. States her back is totally clear. She noticed a few new breakouts this month just before she got her period. 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 Patient also mentions that she would like a refill of her birth control pills.    The patient reports tolerable mucocutaneous dryness, and denies arthralgias, myalgias, depression, suicidal ideation, diarrhea, headache, or blurred vision.        Past Medical History:   Patient Active Problem List   Diagnosis     Acne vulgaris     No past medical history on file.  No past surgical history on file.    Social History:   reports that she has never smoked. She has never used smokeless tobacco. She reports that she does not drink alcohol or use drugs.   -[employer] internship     Family History:  No family history on file.    Medications:  Current Outpatient Medications   Medication Sig Dispense Refill     ISOtretinoin (ACCUTANE) 20 MG capsule Take 1 capsule (20 mg) by mouth daily 30 capsule 0     ISOtretinoin (ACCUTANE) 40 MG capsule Take 1 capsule (40 mg) by mouth daily 30 capsule 0     norgestim-eth estrad triphasic (ORTHO TRI-CYCLEN LO)  0.18/0.215/0.25 MG-25 MCG tablet Take 1 tablet by mouth daily 28 tablet 3     triamcinolone (KENALOG) 0.1 % external ointment Apply topically 2 times daily To areas of dermatitis (arms, lips) until clear. (avoid using on remaining face, groin or axilla) 80 g 0     lidocaine (XYLOCAINE) 2 % external gel Apply topically as needed for moderate pain 30 mL 0       No Known Allergies    Review of Systems:  -Neuro: no HA or vision changes  -GI: No nausea, blood in stool, diarrhea, hx of IBD  -Psych: no depression/anxiety, mood changes, or sleep problems   -Musculoskeletal: no joint or muscle pain or swelling   -Heme/Lymph: no concerning bumps, no bleeding problems  -Constitutional: The patient denies fatigue, fevers, chills, unintended weight loss, and night sweats.  -HEENT: Patient denies nonhealing oral sores.  -Skin: As above in HPI. No additional skin concerns.    Physical exam:  Vitals: There were no vitals taken for this visit.  GEN: This is a well developed, well-nourished female in no acute distress, in a pleasant mood.    SKIN: Acne exam, which includes the face, neck, upper central chest, and upper central back was performed.  SKIN: Acne exam, which includes the face, neck, upper central chest, and upper central back was performed. Significant for:   - Resolving acneiform on her right cheek  - Remaining face is clear   -upper back is clear with some resolving hyperpigmented macules  -No other lesions of concern on areas examined.       Impression/Plan:  1. Acne vulgaris, on isotretinoin - at the end of month #4    Edu on avoidance of alcohol, waxing, skin lasers, tattoos, and blood donation. Reminded patient of risks regarding pregnancy. Discussed iPledge and need to  medication within 7 days of this visit. Advised to d/c all other acne medication. Reminded pt to take medication with a food containing fat.     As she is tolerating the medication well, will increase dose of isotretinoin to 100 mg daily.  Patient will be taking 60 mg for the first 30 days, but than take 40 mg pills for the second month as she will be away in [place]. One month supply with no refills provided. Goal dose is from 150-220 mg/kg (8,250mg-12,100mg) for this 55 kg pt    Previous labs reviewed in Epic, wnl. No additional blood monitoring required.     Qualitative hCG was also obtained    Contraception: OCP & condoms - patient is also abstinent    Total cumulative dose 5,400 mg (98.2.4 mg/kg)     Patient's iPledge # is [id number]    Continue ORTHO TRI-CYCLEN, take 1 tablet by mouth daily, - was refilled at today's visit - this will help with hormonally driven break outs    CC Dr. Be on close of this encounter.  Follow-up in 4 months, earlier for new or changing lesions.       Staff Involved:  Staff/Scribe    Scribe Disclosure:  I, Mary Ozuna, am serving as a scribe to document services personally performed by Ivon Nolan PA-C, based on data collection and the provider's statements to me.     Provider Disclosure:   The documentation recorded by the scribe accurately reflects the services I personally performed and the decisions made by me.    All risks, benefits and alternatives were discussed with patient.  Patient is in agreement and understands the assessment and plan.  All questions were answered.    Ivon Nolan PA-C  [facility]: Phone: [phone], Fax: [phone]

## 2019-04-30 NOTE — NURSING NOTE
Dermatology Rooming Note    Milena Mtz's goals for this visit include:   Chief Complaint   Patient presents with     Accutane     milena is here today for an accutane follow up.      TITA Gu

## 2020-03-10 ENCOUNTER — HEALTH MAINTENANCE LETTER (OUTPATIENT)
Age: 20
End: 2020-03-10

## 2020-12-27 ENCOUNTER — HEALTH MAINTENANCE LETTER (OUTPATIENT)
Age: 20
End: 2020-12-27

## 2021-04-24 ENCOUNTER — HEALTH MAINTENANCE LETTER (OUTPATIENT)
Age: 21
End: 2021-04-24

## 2021-10-09 ENCOUNTER — HEALTH MAINTENANCE LETTER (OUTPATIENT)
Age: 21
End: 2021-10-09

## 2022-05-16 ENCOUNTER — HEALTH MAINTENANCE LETTER (OUTPATIENT)
Age: 22
End: 2022-05-16

## 2022-07-26 NOTE — LETTER
10/26/2018       RE: Sabra Mzt  [address]  [address]  [place] [address]     Dear Colleague,    Thank you for referring your patient, Sabra Mtz, to the [facility] DERMATOLOGY at [facility]. Please see a copy of my visit note below.    [facility] Dermatology Note      Dermatology Problem List:  1. Acne vulgaris    Start PO birthcontrol today    Start clindamycin lotion qAM    Start differin qPM    3wk appointment in [facility] clinic to start accutane    Encounter Date: Oct 26, 2018    CC:   Chief Complaint   Patient presents with     Acne     Sabra is here today to talk about going on acne. Today is a great for her acne. She states that her chest and back are the main areas.          History of Present Illness:  Ms. Sabra Mtz is a 18 year old female who presents for evaluation of acne. The patient states the she has been struggling with acne for the last 7 years. Her face, neck, upper chest and upper back are most affected. She has tried a variety of topicals, oral doxycycline and oral birth control - none of which have made a substantial difference. Her younger sister just completed 6 months of accutane with remarkable results and the patient would like to try it. Her acne fluctuate a lot and are worse in the summer. 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 She denies tobacco, etoh.     Past Medical History:   There is no problem list on file for this patient.    History reviewed. No pertinent past medical history.  History reviewed. No pertinent surgical history.    Social History:  Patient  reports that she has never smoked. She has never used smokeless tobacco.    Family History:  History reviewed. No pertinent family history.    Medications:  No current outpatient prescriptions on file.        No Known Allergies      Review of Systems:  -As per HPI  -Constitutional: The patient denies fatigue, fevers,  Pt notified.   chills, unintended weight loss, and night sweats.  -HEENT: Patient denies nonhealing oral sores.  -Skin: As above in HPI. No additional skin concerns.    Physical exam:  Vitals: There were no vitals taken for this visit.  GEN: This is a well developed, well-nourished female in no acute distress, in a pleasant mood.    SKIN: Acne exam, which includes the face, neck, upper central chest, and upper central back was performed.  -Face and neck - relatively clear without comedones or scarring  -Upper chest is relatively clear without comedones or scarring  -There are superifical acneiform papules with intermixed open and closed comedones on the back.   -No other lesions of concern on areas examined.     Impression/Plan:  2. Acne vulgaris    Start PO birthcontrol today    Start clindamycin lotion qAM    Start differin qPM    3wk appointment in [facility] clinic to start accutane (I would propose 30mg daily for about 6months)    CC Dr. Naidu on close of this encounter.  Follow-up in 3 weeks with [place] for pledge, earlier for new or changing lesions        Staff Physician Comments:  I saw and evaluated the patient with the resident and I agree with the assessment and plan as documented in the resident's note.    Jung Naidu MD  Professor  Head of Dermato-Allergy Division  Department of Dermatology  [facility]  .    Staff Involved:  Resident(Leroy Garcia)/Staff(as above)      Again, thank you for allowing me to participate in the care of your patient.      Sincerely,    Jung Naidu MD

## 2022-09-11 ENCOUNTER — HEALTH MAINTENANCE LETTER (OUTPATIENT)
Age: 22
End: 2022-09-11

## 2023-06-03 ENCOUNTER — HEALTH MAINTENANCE LETTER (OUTPATIENT)
Age: 23
End: 2023-06-03